# Patient Record
Sex: FEMALE | Race: WHITE | NOT HISPANIC OR LATINO | Employment: FULL TIME
[De-identification: names, ages, dates, MRNs, and addresses within clinical notes are randomized per-mention and may not be internally consistent; named-entity substitution may affect disease eponyms.]

---
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
## 2018-03-02 ENCOUNTER — RADIANT APPOINTMENT (OUTPATIENT)
Dept: GENERAL RADIOLOGY | Facility: CLINIC | Age: 18
End: 2018-03-02
Attending: PREVENTIVE MEDICINE
Payer: COMMERCIAL

## 2018-03-02 ENCOUNTER — OFFICE VISIT (OUTPATIENT)
Dept: FAMILY MEDICINE | Facility: CLINIC | Age: 18
End: 2018-03-02
Payer: COMMERCIAL

## 2018-03-02 VITALS
HEIGHT: 72 IN | SYSTOLIC BLOOD PRESSURE: 137 MMHG | HEART RATE: 79 BPM | BODY MASS INDEX: 28.44 KG/M2 | DIASTOLIC BLOOD PRESSURE: 80 MMHG | WEIGHT: 210 LBS

## 2018-03-02 DIAGNOSIS — M54.12 CERVICAL RADICULAR PAIN: ICD-10-CM

## 2018-03-02 DIAGNOSIS — M25.512 ACUTE PAIN OF LEFT SHOULDER: ICD-10-CM

## 2018-03-02 DIAGNOSIS — M25.512 ACUTE PAIN OF LEFT SHOULDER: Primary | ICD-10-CM

## 2018-03-02 RX ORDER — NAPROXEN 500 MG/1
1 TABLET ORAL 2 TIMES DAILY
Qty: 30 TABLET | Refills: 1 | Status: SHIPPED | OUTPATIENT
Start: 2018-03-02 | End: 2018-11-30

## 2018-03-02 NOTE — MR AVS SNAPSHOT
"              After Visit Summary   3/2/2018    Shante Lara    MRN: [id number]           Patient Information     Date Of Birth          2000        Visit Information        Provider Department      3/2/2018 9:45 AM Collin Palacios MD [facility]        Today's Diagnoses     Acute pain of left shoulder    -  1    Cervical radicular pain           Follow-ups after your visit        Future tests that were ordered for you today     Open Future Orders        Priority Expected Expires Ordered    XR Cervical Spine 2/3 vws Routine 3/2/2018 4/1/2018 3/2/2018    XR Shoulder LT 2 VW Routine 3/2/2018 4/1/2018 3/2/2018            Who to contact     Please call your clinic at [phone] to:    Ask questions about your health    Make or cancel appointments    Discuss your medicines    Learn about your test results    Speak to your doctor            Additional Information About Your Visit        MyChart Information     [employer] gives you secure access to your electronic health record. If you see a primary care provider, you can also send messages to your care team and make appointments. If you have questions, please call your primary care clinic.  If you do not have a primary care provider, please call [phone] and they will assist you.      [employer] is an electronic gateway that provides easy, online access to your medical records. With [employer], you can request a clinic appointment, read your test results, renew a prescription or communicate with your care team.     To access your existing account, please contact your [facility] or call [phone] for assistance.        Care EveryWhere ID     This is your Care EveryWhere ID. This could be used by other organizations to access your [place] medical records  [id number]        Your Vitals Were     Pulse Height Last Period BMI (Body Mass Index)          79 1.905 m (6' 3\") 02/16/2018 (Approximate) 26.25 " kg/m2         Blood Pressure from Last 3 Encounters:   03/02/18 137/80   10/30/17 138/84   10/27/17 129/69    Weight from Last 3 Encounters:   03/02/18 95.3 kg (210 lb)   10/30/17 103.6 kg (228 lb 4.8 oz) (99 %)*   10/27/17 104.3 kg (230 lb) (99 %)*     * Growth percentiles are based on [facility] 2-20 Years data.                 Today's Medication Changes          These changes are accurate as of 3/2/18 10:29 AM.  If you have any questions, ask your nurse or doctor.               Start taking these medicines.        Dose/Directions    tiZANidine 4 MG tablet   Commonly known as:  ZANAFLEX   Used for:  Acute pain of left shoulder, Cervical radicular pain        Dose:  4-8 mg   Take 1-2 tablets (4-8 mg) by mouth nightly as needed for muscle spasms   Quantity:  30 tablet   Refills:  1            Where to get your medicines      These medications were sent to [place] [address] [place] [address]  [address] [place] [address]     Phone:  [phone]     naproxen 500 MG Tbec    tiZANidine 4 MG tablet                Primary Care Provider Office Phone # Fax #    Lucinda Cook [phone]       [facility] [address] [place] [address]        Equal Access to Services     NORA ANGELA AH: Hadii monica londono hadasho Sojuan, waaxda luqadaha, qaybta kaalmada yousif, mayur smith. So [facility] [phone].    ATENCIÓN: Si habla español, tiene a bright disposición servicios gratuitos de asistencia lingüística. Geraldine al [phone].    We comply with applicable federal civil rights laws and [place] laws. We do not discriminate on the basis of race, color, national origin, age, disability, sex, sexual orientation, or gender identity.            Thank you!     Thank you for choosing [facility]  for your care. Our goal is always to provide you with excellent care. Hearing back from our patients is one way we can continue  to improve our services. Please take a few minutes to complete the written survey that you may receive in the mail after your visit with us. Thank you!             Your Updated Medication List - Protect others around you: Learn how to safely use, store and throw away your medicines at www.disposemymeds.org.          This list is accurate as of 3/2/18 10:29 AM.  Always use your most recent med list.                   Brand Name Dispense Instructions for use Diagnosis    naproxen 500 MG Tbec     30 tablet    Take 1 capsule by mouth 2 times daily    Acute pain of left shoulder, Cervical radicular pain       tiZANidine 4 MG tablet    ZANAFLEX    30 tablet    Take 1-2 tablets (4-8 mg) by mouth nightly as needed for muscle spasms    Acute pain of left shoulder, Cervical radicular pain       venlafaxine 150 MG 24 hr capsule    EFFEXOR-XR     Take by mouth daily

## 2018-03-02 NOTE — PROGRESS NOTES
"HISTORY OF PRESENT ILLNESS  Ms. Lara is a pleasant 18 year old year old female who presents to clinic today with left shoulder pain x 2 weeks  Shante explains that she had no injury  Location: left shoulder  Quality:  achy pain    Severity: 5/10 at worst    Duration: 2 weeks  Timing: occurs intermittently with rowing worse  Modifying factors:  resting and non-use makes it better, movement and use makes it worse  Associated signs & symptoms: left arm numbness tingling, no signifcant neck pain  Previous similar pain: yes  Additional history: as documented    MEDICAL HISTORY  There is no problem list on file for this patient.      Current Outpatient Prescriptions   Medication Sig Dispense Refill     naproxen 500 MG TBEC Take 1 capsule by mouth 2 times daily 30 tablet 1     venlafaxine (EFFEXOR-XR) 150 MG 24 hr capsule Take by mouth daily         Allergies   Allergen Reactions     Sulfa Drugs Difficulty breathing       No family history on file.    Additional medical/Social/Surgical histories reviewed in EPIC and updated as appropriate.     REVIEW OF SYSTEMS (3/2/2018)  10 point ROS of systems including Constitutional, Eyes, Respiratory, Cardiovascular, Gastroenterology, Genitourinary, Integumentary, Musculoskeletal, Psychiatric were all negative except for pertinent positives noted in my HPI.     PHYSICAL EXAM  Vitals:    03/02/18 0945   BP: 137/80   Pulse: 79   Weight: 95.3 kg (210 lb)   Height: 1.905 m (6' 3\")     Vital Signs: /80  Pulse 79  Ht 1.905 m (6' 3\")  Wt 95.3 kg (210 lb)  LMP 02/16/2018 (Approximate)  BMI 26.25 kg/m2 Patient declined being weighed. Body mass index is 26.25 kg/(m^2).    General  - normal appearance, in no obvious distress  CV  - normal radial pulse  Pulm  - normal respiratory pattern, non-labored  Musculoskeletal - neck and left shoulder  - inspection: normal bone and joint alignment, no obvious deformity, no scapular winging, no AC step-off  - palpation: no bony or soft " tissue tenderness except left neck and upper shoulder trapezius, normal clavicle, non-tender AC  - ROM:  limited flexion, IR, ER, abduction, painful at end range of shoulder, and neck pain with left side bending, and with spurlings of neck  - strength: 5/5  strength, 5/5 in all shoulder planes  - special tests:  (-) Speed's  (-) Neer  (+) Hawkin's  (-) Bakari's  (+) Wendell's  (+) load & shift, supine  (-) apprehension  (-) subscap lift-off  Neuro  - no sensory or motor deficit, grossly normal coordination, normal muscle tone  Skin  - no ecchymosis, erythema, warmth, or induration, no obvious rash  Psych  - interactive, appropriate, normal mood and affect      ASSESSMENT & PLAN  19 yo with left shoulder pain and left arm tingling and left neck pain due to RC tendinitis, cervical radicular pain  xrays shoulder and neck  Naproxen and tizanadine nightly  Cont. Rehab and activity as tolerated   F/u after xrays  Consider MRI of shoulder and/or neck if not improving    Collin Palacios MD, CAQSM

## 2018-03-02 NOTE — LETTER
"  3/2/2018      RE: Shante Lara  [address] [place] [address]       HISTORY OF PRESENT ILLNESS  Ms. Lara is a pleasant 18 year old year old female who presents to clinic today with left shoulder pain x 2 weeks  Shante explains that she had no injury  Location: left shoulder  Quality:  achy pain    Severity: 5/10 at worst    Duration: 2 weeks  Timing: occurs intermittently with rowing worse  Modifying factors:  resting and non-use makes it better, movement and use makes it worse  Associated signs & symptoms: left arm numbness tingling, no signifcant neck pain  Previous similar pain: yes  Additional history: as documented    MEDICAL HISTORY  There is no problem list on file for this patient.      Current Outpatient Prescriptions   Medication Sig Dispense Refill     naproxen 500 MG TBEC Take 1 capsule by mouth 2 times daily 30 tablet 1     venlafaxine (EFFEXOR-XR) 150 MG 24 hr capsule Take by mouth daily         Allergies   Allergen Reactions     Sulfa Drugs Difficulty breathing       No family history on file.    Additional medical/Social/Surgical histories reviewed in [facility] and updated as appropriate.     REVIEW OF SYSTEMS (3/2/2018)  10 point ROS of systems including Constitutional, Eyes, Respiratory, Cardiovascular, Gastroenterology, Genitourinary, Integumentary, Musculoskeletal, Psychiatric were all negative except for pertinent positives noted in my HPI.     PHYSICAL EXAM  Vitals:    03/02/18 0945   BP: 137/80   Pulse: 79   Weight: 95.3 kg (210 lb)   Height: 1.905 m (6' 3\")     Vital Signs: /80  Pulse 79  Ht 1.905 m (6' 3\")  Wt 95.3 kg (210 lb)  LMP 02/16/2018 (Approximate)  BMI 26.25 kg/m2 Patient declined being weighed. Body mass index is 26.25 kg/(m^2).    General  - normal appearance, in no obvious distress  CV  - normal radial pulse  Pulm  - normal respiratory pattern, non-labored  Musculoskeletal - neck and left shoulder  - inspection: normal bone and joint " alignment, no obvious deformity, no scapular winging, no AC step-off  - palpation: no bony or soft tissue tenderness except left neck and upper shoulder trapezius, normal clavicle, non-tender AC  - ROM:  limited flexion, IR, ER, abduction, painful at end range of shoulder, and neck pain with left side bending, and with spurlings of neck  - strength: 5/5  strength, 5/5 in all shoulder planes  - special tests:  (-) Speed's  (-) Neer  (+) Hawkin's  (-) Bakari's  (+) Chittenden's  (+) load & shift, supine  (-) apprehension  (-) subscap lift-off  Neuro  - no sensory or motor deficit, grossly normal coordination, normal muscle tone  Skin  - no ecchymosis, erythema, warmth, or induration, no obvious rash  Psych  - interactive, appropriate, normal mood and affect      ASSESSMENT & PLAN  17 yo with left shoulder pain and left arm tingling and left neck pain due to RC tendinitis, cervical radicular pain  xrays shoulder and neck  Naproxen and tizanadine nightly  Cont. Rehab and activity as tolerated   F/u after xrays  Consider MRI of shoulder and/or neck if not improving    Collin Palacios MD, CAQSM

## 2018-03-05 ENCOUNTER — OFFICE VISIT (OUTPATIENT)
Dept: FAMILY MEDICINE | Facility: CLINIC | Age: 18
End: 2018-03-05
Payer: COMMERCIAL

## 2018-03-05 VITALS
DIASTOLIC BLOOD PRESSURE: 88 MMHG | HEART RATE: 90 BPM | BODY MASS INDEX: 31.77 KG/M2 | WEIGHT: 234.6 LBS | HEIGHT: 72 IN | SYSTOLIC BLOOD PRESSURE: 135 MMHG

## 2018-03-05 DIAGNOSIS — R20.2 TINGLING OF LEFT UPPER EXTREMITY: ICD-10-CM

## 2018-03-05 DIAGNOSIS — M54.12 CERVICAL RADICULAR PAIN: Primary | ICD-10-CM

## 2018-03-05 RX ORDER — METHYLPREDNISOLONE 4 MG
TABLET, DOSE PACK ORAL
Qty: 21 TABLET | Refills: 0 | Status: SHIPPED | OUTPATIENT
Start: 2018-03-05 | End: 2018-11-30

## 2018-03-05 NOTE — LETTER
"  3/5/2018      RE: Shante Lara  [address] [place] [address]       HISTORY OF PRESENT ILLNESS  Ms. Lara is a pleasant 18 year old year old female who presents to clinic today for followup for neck and shoulder pain, has some improvement in shoulder pain and ROM but her neck still hurts and feels tingling down her left arm to her hand that has not resolved    MEDICAL HISTORY  There is no problem list on file for this patient.      Current Outpatient Prescriptions   Medication Sig Dispense Refill     naproxen 500 MG TBEC Take 1 capsule by mouth 2 times daily (Patient not taking: Reported on 3/5/2018) 30 tablet 1     tiZANidine (ZANAFLEX) 4 MG tablet Take 1-2 tablets (4-8 mg) by mouth nightly as needed for muscle spasms (Patient not taking: Reported on 3/5/2018) 30 tablet 1     venlafaxine (EFFEXOR-XR) 150 MG 24 hr capsule Take by mouth daily         Allergies   Allergen Reactions     Sulfa Drugs Difficulty breathing       No family history on file.    Additional medical/Social/Surgical histories reviewed in EPIC and updated as appropriate.     REVIEW OF SYSTEMS (3/5/2018)  10 point ROS of systems including Constitutional, Eyes, Respiratory, Cardiovascular, Gastroenterology, Genitourinary, Integumentary, Musculoskeletal, Psychiatric were all negative except for pertinent positives noted in my HPI.     PHYSICAL EXAM  Vitals:    03/05/18 1637   BP: 135/88   Pulse: 90   Weight: 106.4 kg (234 lb 9.6 oz)   Height: 1.905 m (6' 3\")     Vital Signs: /88  Pulse 90  Ht 1.905 m (6' 3\")  Wt 106.4 kg (234 lb 9.6 oz)  LMP 02/16/2018 (Approximate)  BMI 29.32 kg/m2 Patient declined being weighed. Body mass index is 29.32 kg/(m^2).    General  - normal appearance, in no obvious distress  CV  - normal radial pulse  Pulm  - normal respiratory pattern, non-labored  Musculoskeletal - neck and left shoulder  - inspection: normal bone and joint alignment, no obvious deformity, no scapular winging, " no AC step-off  - palpation: no bony or soft tissue tenderness except left neck and upper shoulder trapezius, normal clavicle, non-tender AC  - ROM: neck still has some limited flexion and extension, but shoulder has improved IR, ER, abduction, still slightly painful at end range of shoulder, and neck pain with left side bending, and with spurlings of neck, not improved  - strength: 5/5  strength, 5/5 in all shoulder planes  - special tests:  (-) Speed's  (-) Neer  (+) Hawkin's  (-) Bakari's  (+) Llano's, improved  (+) load & shift, supine, improved  (-) apprehension  (-) subscap lift-off  Neuro  - no sensory or motor deficit, grossly normal coordination, normal muscle tone  Skin  - no ecchymosis, erythema, warmth, or induration, no obvious rash  Psych  - interactive, appropriate, normal mood and affect      ASSESSMENT & PLAN  19 yo with left shoulder pain and left arm tingling and left neck pain due to RC tendinitis, cervical radicular pain, not improved  Ordered MRI cervical spine  Start medrol pack  F/u after MRI  Collin Palacios MD, CAQSM

## 2018-03-05 NOTE — MR AVS SNAPSHOT
After Visit Summary   3/5/2018    Shante Lara    MRN: [id number]           Patient Information     Date Of Birth          2000        Visit Information        Provider Department      3/5/2018 4:45 PM Collin Palacios MD [facility]        Today's Diagnoses     Cervical radicular pain    -  1    Tingling of left upper extremity           Follow-ups after your visit        Your next 10 appointments already scheduled     Mar 06, 2018  2:30 PM CST   (Arrive by 2:15 PM)   MR CERVICAL SPINE W/O CONTRAST with [id number]   [facility] [place] [facility] ([facility] [place])    [address] [address] [phone]           Take your medicines as usual, unless your doctor tells you not to. Bring a list of your current medicines to your exam (including vitamins, minerals and over-the-counter drugs). Also bring the results of similar scans you may have had.  Please remove any body piercings and hair extensions before you arrive.  Follow your doctor s orders. If you do not, we may have to postpone your exam.  You may or may not receive IV contrast for this exam pending the discretion of the Radiologist.  You do not need to do anything special to prepare.  The MRI machine uses a strong magnet. Please wear clothes without metal (snaps, zippers). A sweatsuit works well, or we may give you a hospital gown.   **IMPORTANT** THE INSTRUCTIONS BELOW ARE ONLY FOR THOSE PATIENTS WHO HAVE BEEN PRESCRIBED SEDATION OR GENERAL ANESTHESIA DURING THEIR MRI PROCEDURE:  IF YOUR DOCTOR PRESCRIBED ORAL SEDATION (take medicine to help you relax during your exam):   You must get the medicine from your doctor (oral medication) before you arrive. Bring the medicine to the exam. Do not take it at home. You ll be told when to take it upon arriving for your exam.   Arrive one hour early. Bring someone who can take you home after the test. Your medicine  will make you sleepy. After the exam, you may not drive, take a bus or take a taxi by yourself.  IF YOUR DOCTOR PRESCRIBED IV SEDATION:   Arrive one hour early. Bring someone who can take you home after the test. Your medicine will make you sleepy. After the exam, you may not drive, take a bus or take a taxi by yourself.   No eating 6 hours before your exam. You may have clear liquids up until 4 hours before your exam. (Clear liquids include water, clear tea, black coffee and fruit juice without pulp.)  IF YOUR DOCTOR PRESCRIBED ANESTHESIA (be asleep for your exam):   Arrive 1 1/2 hours early. Bring someone who can take you home after the test. You may not drive, take a bus or take a taxi by yourself.   No eating 8 hours before your exam. You may have clear liquids up until 4 hours before your exam. (Clear liquids include water, clear tea, black coffee and fruit juice without pulp.)   You will spend four to five hours in the recovery room.  Please call the Imaging Department at your exam site with any questions.              Future tests that were ordered for you today     Open Future Orders        Priority Expected Expires Ordered    MRI Cervical spine w/o contrast Routine  3/12/2018 3/5/2018            Who to contact     Please call your clinic at [phone] to:    Ask questions about your health    Make or cancel appointments    Discuss your medicines    Learn about your test results    Speak to your doctor            Additional Information About Your Visit        [employer] Information     [employer] gives you secure access to your electronic health record. If you see a primary care provider, you can also send messages to your care team and make appointments. If you have questions, please call your primary care clinic.  If you do not have a primary care provider, please call [phone] and they will assist you.      [employer] is an electronic gateway that provides easy, online access to your medical records. With  "MyChart, you can request a clinic appointment, read your test results, renew a prescription or communicate with your care team.     To access your existing account, please contact your [facility] or call [phone] for assistance.        Care EveryWhere ID     This is your Care EveryWhere ID. This could be used by other organizations to access your [place] medical records  [id number]        Your Vitals Were     Pulse Height Last Period BMI (Body Mass Index)          90 1.905 m (6' 3\") 02/16/2018 (Approximate) 29.32 kg/m2         Blood Pressure from Last 3 Encounters:   03/05/18 135/88   03/02/18 137/80   10/30/17 138/84    Weight from Last 3 Encounters:   03/05/18 106.4 kg (234 lb 9.6 oz)   03/02/18 95.3 kg (210 lb)   10/30/17 103.6 kg (228 lb 4.8 oz) (99 %)*     * Growth percentiles are based on CDC 2-20 Years data.                 Today's Medication Changes          These changes are accurate as of 3/5/18 11:59 PM.  If you have any questions, ask your nurse or doctor.               Start taking these medicines.        Dose/Directions    methylPREDNISolone 4 MG tablet   Commonly known as:  MEDROL DOSEPAK   Used for:  Cervical radicular pain        Follow package instructions   Quantity:  21 tablet   Refills:  0            Where to get your medicines      These medications were sent to [facility] [address] [place] [facility] [address]   [address] [facility] [address]     Phone:  [phone]     methylPREDNISolone 4 MG tablet                Primary Care Provider Office Phone # Fax #    Lucinda Cook [phone]       [facility] [address] [address] [address]        Equal Access to Services     CUONG ANGELA AH: Adalid Sierra, aby verdin, mayur newton. So [facility] [phone].    ATENCIÓN: Si habla español, tiene a bright disposición servicios gratuitos " de asistencia lingüística. Geraldine barone [phone].    We comply with applicable federal civil rights laws and [place] laws. We do not discriminate on the basis of race, color, national origin, age, disability, sex, sexual orientation, or gender identity.            Thank you!     Thank you for choosing [facility]  for your care. Our goal is always to provide you with excellent care. Hearing back from our patients is one way we can continue to improve our services. Please take a few minutes to complete the written survey that you may receive in the mail after your visit with us. Thank you!             Your Updated Medication List - Protect others around you: Learn how to safely use, store and throw away your medicines at www.disposemymeds.org.          This list is accurate as of 3/5/18 11:59 PM.  Always use your most recent med list.                   Brand Name Dispense Instructions for use Diagnosis    methylPREDNISolone 4 MG tablet    MEDROL DOSEPAK    21 tablet    Follow package instructions    Cervical radicular pain       naproxen 500 MG Tbec     30 tablet    Take 1 capsule by mouth 2 times daily    Acute pain of left shoulder, Cervical radicular pain       tiZANidine 4 MG tablet    ZANAFLEX    30 tablet    Take 1-2 tablets (4-8 mg) by mouth nightly as needed for muscle spasms    Acute pain of left shoulder, Cervical radicular pain       venlafaxine 150 MG 24 hr capsule    EFFEXOR-XR     Take by mouth daily

## 2018-03-05 NOTE — PROGRESS NOTES
"HISTORY OF PRESENT ILLNESS  Ms. Lara is a pleasant 18 year old year old female who presents to clinic today for followup for neck and shoulder pain, has some improvement in shoulder pain and ROM but her neck still hurts and feels tingling down her left arm to her hand that has not resolved    MEDICAL HISTORY  There is no problem list on file for this patient.      Current Outpatient Prescriptions   Medication Sig Dispense Refill     naproxen 500 MG TBEC Take 1 capsule by mouth 2 times daily (Patient not taking: Reported on 3/5/2018) 30 tablet 1     tiZANidine (ZANAFLEX) 4 MG tablet Take 1-2 tablets (4-8 mg) by mouth nightly as needed for muscle spasms (Patient not taking: Reported on 3/5/2018) 30 tablet 1     venlafaxine (EFFEXOR-XR) 150 MG 24 hr capsule Take by mouth daily         Allergies   Allergen Reactions     Sulfa Drugs Difficulty breathing       No family history on file.    Additional medical/Social/Surgical histories reviewed in EPIC and updated as appropriate.     REVIEW OF SYSTEMS (3/5/2018)  10 point ROS of systems including Constitutional, Eyes, Respiratory, Cardiovascular, Gastroenterology, Genitourinary, Integumentary, Musculoskeletal, Psychiatric were all negative except for pertinent positives noted in my HPI.     PHYSICAL EXAM  Vitals:    03/05/18 1637   BP: 135/88   Pulse: 90   Weight: 106.4 kg (234 lb 9.6 oz)   Height: 1.905 m (6' 3\")     Vital Signs: /88  Pulse 90  Ht 1.905 m (6' 3\")  Wt 106.4 kg (234 lb 9.6 oz)  LMP 02/16/2018 (Approximate)  BMI 29.32 kg/m2 Patient declined being weighed. Body mass index is 29.32 kg/(m^2).    General  - normal appearance, in no obvious distress  CV  - normal radial pulse  Pulm  - normal respiratory pattern, non-labored  Musculoskeletal - neck and left shoulder  - inspection: normal bone and joint alignment, no obvious deformity, no scapular winging, no AC step-off  - palpation: no bony or soft tissue tenderness except left neck and upper " shoulder trapezius, normal clavicle, non-tender AC  - ROM: neck still has some limited flexion and extension, but shoulder has improved IR, ER, abduction, still slightly painful at end range of shoulder, and neck pain with left side bending, and with spurlings of neck, not improved  - strength: 5/5  strength, 5/5 in all shoulder planes  - special tests:  (-) Speed's  (-) Neer  (+) Hawkin's  (-) Bakari's  (+) Willacy's, improved  (+) load & shift, supine, improved  (-) apprehension  (-) subscap lift-off  Neuro  - no sensory or motor deficit, grossly normal coordination, normal muscle tone  Skin  - no ecchymosis, erythema, warmth, or induration, no obvious rash  Psych  - interactive, appropriate, normal mood and affect      ASSESSMENT & PLAN  17 yo with left shoulder pain and left arm tingling and left neck pain due to RC tendinitis, cervical radicular pain, not improved  Ordered MRI cervical spine  Start medrol pack  F/u after MRI  Collin Palcaios MD, CAQSM

## 2018-03-06 ENCOUNTER — RADIANT APPOINTMENT (OUTPATIENT)
Dept: MRI IMAGING | Facility: CLINIC | Age: 18
End: 2018-03-06
Attending: PREVENTIVE MEDICINE
Payer: COMMERCIAL

## 2018-03-06 DIAGNOSIS — R20.2 TINGLING OF LEFT UPPER EXTREMITY: ICD-10-CM

## 2018-03-06 DIAGNOSIS — M54.12 CERVICAL RADICULAR PAIN: ICD-10-CM

## 2018-03-29 ENCOUNTER — OFFICE VISIT (OUTPATIENT)
Dept: FAMILY MEDICINE | Facility: CLINIC | Age: 18
End: 2018-03-29
Payer: COMMERCIAL

## 2018-03-29 VITALS
HEART RATE: 83 BPM | HEIGHT: 72 IN | DIASTOLIC BLOOD PRESSURE: 74 MMHG | WEIGHT: 237 LBS | BODY MASS INDEX: 32.1 KG/M2 | SYSTOLIC BLOOD PRESSURE: 124 MMHG

## 2018-03-29 DIAGNOSIS — M26.609 TEMPOROMANDIBULAR JOINT DISORDER: Primary | ICD-10-CM

## 2018-03-29 NOTE — PROGRESS NOTES
HPI  In for R TMJ pain.  She grinds her jaw a lot with rowing.  Has been bugging her for quite awhile now.  No other new issues.  Tried some manipulation that isn't helping.    ROS  As above    Physical Exam  Pain over the R TMJ joint.  Hurts with movement and palpation.    R TMJ  -will see Dr. Ann ENT for this asap  -soft diet avoid gum and excessive chewing

## 2018-03-29 NOTE — LETTER
3/29/2018      RE: Shante Lara  [address] [place] [address]       HPI  In for R TMJ pain.  She grinds her jaw a lot with rowing.  Has been bugging her for quite awhile now.  No other new issues.  Tried some manipulation that isn't helping.    ROS  As above    Physical Exam  Pain over the R TMJ joint.  Hurts with movement and palpation.    R TMJ  -will see Dr. Ann ENT for this asap  -soft diet avoid gum and excessive chewing    Collin eBst MD

## 2018-03-29 NOTE — MR AVS SNAPSHOT
"              After Visit Summary   3/29/2018    Shante Lara    MRN: [id number]           Patient Information     Date Of Birth          2000        Visit Information        Provider Department      3/29/2018 11:30 AM Collin Best MD [facility]        Today's Diagnoses     Temporomandibular joint disorder    -  1       Follow-ups after your visit        Who to contact     Please call your clinic at [phone] to:    Ask questions about your health    Make or cancel appointments    Discuss your medicines    Learn about your test results    Speak to your doctor            Additional Information About Your Visit        MyChart Information     [employer] gives you secure access to your electronic health record. If you see a primary care provider, you can also send messages to your care team and make appointments. If you have questions, please call your primary care clinic.  If you do not have a primary care provider, please call [phone] and they will assist you.      [employer] is an electronic gateway that provides easy, online access to your medical records. With [employer], you can request a clinic appointment, read your test results, renew a prescription or communicate with your care team.     To access your existing account, please contact your [facility] or call [phone] for assistance.        Care EveryWhere ID     This is your Care EveryWhere ID. This could be used by other organizations to access your [place] medical records  [id number]        Your Vitals Were     Pulse Height BMI (Body Mass Index)             83 1.905 m (6' 3\") 29.62 kg/m2          Blood Pressure from Last 3 Encounters:   03/29/18 124/74   03/05/18 135/88   03/02/18 137/80    Weight from Last 3 Encounters:   03/29/18 107.5 kg (237 lb)   03/05/18 106.4 kg (234 lb 9.6 oz)   03/02/18 95.3 kg (210 lb)              Today, you had the following     No orders found for display "       Primary Care Provider Office Phone # Fax #    Lucinda Cook [phone]       [facility] PEDIATRICS [address] [facility] [address]  [facility] [address]        Equal Access to Services     NORA ANGELA : Adalid monica londono suo Somarinali, waaxda luqadaha, qaybta kaalmada adeegyada, mayur limaannetta smith. So [facility] [phone].    ATENCIÓN: Si habla español, tiene a bright disposición servicios gratuitos de asistencia lingüística. [facility] [phone].    We comply with applicable federal civil rights laws and [place] laws. We do not discriminate on the basis of race, color, national origin, age, disability, sex, sexual orientation, or gender identity.            Thank you!     Thank you for choosing [facility] [place]  for your care. Our goal is always to provide you with excellent care. Hearing back from our patients is one way we can continue to improve our services. Please take a few minutes to complete the written survey that you may receive in the mail after your visit with us. Thank you!             Your Updated Medication List - Protect others around you: Learn how to safely use, store and throw away your medicines at www.disposemymeds.org.          This list is accurate as of 3/29/18 12:24 PM.  Always use your most recent med list.                   Brand Name Dispense Instructions for use Diagnosis    methylPREDNISolone 4 MG tablet    MEDROL DOSEPAK    21 tablet    Follow package instructions    Cervical radicular pain       naproxen 500 MG Tbec     30 tablet    Take 1 capsule by mouth 2 times daily    Acute pain of left shoulder, Cervical radicular pain       tiZANidine 4 MG tablet    ZANAFLEX    30 tablet    Take 1-2 tablets (4-8 mg) by mouth nightly as needed for muscle spasms    Acute pain of left shoulder, Cervical radicular pain       venlafaxine 150 MG 24 hr capsule    EFFEXOR-XR     Take by mouth daily

## 2018-04-16 NOTE — TELEPHONE ENCOUNTER
FUTURE VISIT INFORMATION      FUTURE VISIT INFORMATION:    Date: 04/18/2018    Time: 11:30    Location: [facility]  REFERRAL INFORMATION:    Referring provider:  DR. FLOOD    Referring providers clinic:  [facility]ACT[facility]    Reason for visit/diagnosis: R  TMJ JOINT        RECORDS STATUS    OFFICE VISIT: 03/29/2018

## 2018-04-18 ENCOUNTER — PRE VISIT (OUTPATIENT)
Dept: OTOLARYNGOLOGY | Facility: CLINIC | Age: 18
End: 2018-04-18

## 2018-04-18 ENCOUNTER — OFFICE VISIT (OUTPATIENT)
Dept: OTOLARYNGOLOGY | Facility: CLINIC | Age: 18
End: 2018-04-18
Payer: COMMERCIAL

## 2018-04-18 DIAGNOSIS — R19.8 TEETH CLENCHING: Primary | ICD-10-CM

## 2018-04-18 ASSESSMENT — PAIN SCALES - GENERAL: PAINLEVEL: NO PAIN (0)

## 2018-04-18 NOTE — NURSING NOTE
Chief Complaint   Patient presents with     Consult     Consultation for possible TMJ      Jluis Nair

## 2018-04-18 NOTE — PROGRESS NOTES
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 She is experience inceased pain in the pre-Richler pace rigt sidereater jacinto left. Sh denies subuation andisocaton an act she denies acual grinding her teeth. She notices it more duing the stress othe game and not at night. She has no dysphasia diet aphsia shortness ofbreat fever chlls.     She's experiecd o tamt e canoaia rtre.  Herptmdcalhisoryi reeed a u mdcionaaloy ta.  rewf ytems sifcatat shsa n-sker andes o rnk aco. She dd undergo ortodontic treatment but this ad no sequela margoth that time.      past medical histor is unremarkable.     Review of systems significant only for th above.     Examination shows no tenderness on palpation to ither pre-o patcua mahogany. There is no early ely clicks or crepitus flt. The occlusions clss I wit no occlusal premature duties. There's no evidenceof grnding or bruxis Maxalt size openingis approimately 42 mm.  There is no occlusal sway. Remainder of head nd neck examinations unremakabl    Assessmet: probable occlusl clenchig.     Plan: would attain a panoramic's to rule out TMJ pathology. At he same time an occlsl philip polo will refer to dentitryfor treatment. Follow up with us as needd.

## 2018-04-18 NOTE — LETTER
4/18/2018       RE: Shante Lara  [address] [place] [address]     Dear Colleague,    Thank you for referring your patient, Shante Lara, to the [facility] EAR NOSE AND THROAT at [facility]. Please see a copy of my visit note below.     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 She is experience inceased pain in the pre-Richler pace rigt sidereater jacinto left. Sh denies subuation andisocaton an act she denies acual grinding her teeth. She notices it more duing the stress othe game and not at night. She has no dysphasia diet aphsia shortness ofbreat fever chlls.     She's experiecd o tamt e canoaia rtre.  Herptmdcalhisoryi reeed a u mdcionaaloy ta.  rewf ytems sifcatat shsa n-sker andes o rnk aco. She dd undergo ortodontic treatment but this ad no sequela margoth that time.      past medical histor is unremarkable.     Review of systems significant only for th above.     Examination shows no tenderness on palpation to ither pre-o patcua mahogany. There is no early ely clicks or crepitus flt. The occlusions clss I wit no occlusal premature duties. There's no evidenceof grnding or bruxis Maxalt size openingis approimately 42 mm.  There is no occlusal sway. Remainder of head nd neck examinations unremakabl    Assessmet: probable occlusl clenchig.     Plan: would attain a panoramic's to rule out TMJ pathology. At he same time an occlsl bte nora polo will refer to dentitryfor treatment. Follow up with us as needd.    Again, thank you for allowing me to participate in the care of your patient.      Sincerely,    Collin Ann MD

## 2018-04-18 NOTE — MR AVS SNAPSHOT
After Visit Summary   4/18/2018    Shante Lara    MRN: [id number]           Patient Information     Date Of Birth          2000        Visit Information        Provider Department      4/18/2018 11:30 AM Collin Ann MD [facility] Nose [facility] Th[facility]        Today's Diagnoses     Teeth clenching    -  1      Care Instructions    You were referred to the dental clinic to have an impression made for a custom bite garde to be made.  Josafat SiddiquiRN  [phone]              Follow-ups after your visit        Follow-up notes from your care team     Return if symptoms worsen or fail to improve.      Who to contact     Please call your clinic at [phone] to:    Ask questions about your health    Make or cancel appointments    Discuss your medicines    Learn about your test results    Speak to your doctor            Additional Information About Your Visit        MyChart Information     [employer] gives you secure access to your electronic health record. If you see a primary care provider, you can also send messages to your care team and make appointments. If you have questions, please call your primary care clinic.  If you do not have a primary care provider, please call [phone] and they will assist you.      [employer] is an electronic gateway that provides easy, online access to your medical records. With [employer], you can request a clinic appointment, read your test results, renew a prescription or communicate with your care team.     To access your existing account, please contact your [facility] or call [phone] for assistance.        Care EveryWhere ID     This is your Care EveryWhere ID. This could be used by other organizations to access your [place] medical records  [id number]         Blood Pressure from Last 3 Encounters:   03/29/18 124/74   03/05/18 135/88   03/02/18 137/80    Weight from Last 3 Encounters:   03/29/18 107.5 kg  (237 lb)   03/05/18 106.4 kg (234 lb 9.6 oz)   03/02/18 95.3 kg (210 lb)              Today, you had the following     No orders found for display       Primary Care Provider Office Phone # Fax #    Lucinda Cook [phone]       [facility] PEDIATRICS [address]  [facility] [address]        Equal Access to Services     [facility] Community HospitalISMAEL : Hadii aad ku hadasho Soomaali, waaxda luqadaha, qaybta kaalmada adeegyada, waxay idiin hayaan adeeg khruby lalesleyn . So [facility] [phone].    ATENCIÓN: Si hughla rick, tiene a bright disposición servicios gratuitos de asistencia lingüística. Llame al [phone].    We comply with applicable federal civil rights laws and [place] laws. We do not discriminate on the basis of race, color, national origin, age, disability, sex, sexual orientation, or gender identity.            Thank you!     Thank you for choosing [facility] EAR NOSE AND THROAT  for your care. Our goal is always to provide you with excellent care. Hearing back from our patients is one way we can continue to improve our services. Please take a few minutes to complete the written survey that you may receive in the mail after your visit with us. Thank you!             Your Updated Medication List - Protect others around you: Learn how to safely use, store and throw away your medicines at www.disposemymeds.org.          This list is accurate as of 4/18/18  1:16 PM.  Always use your most recent med list.                   Brand Name Dispense Instructions for use Diagnosis    methylPREDNISolone 4 MG tablet    MEDROL DOSEPAK    21 tablet    Follow package instructions    Cervical radicular pain       naproxen 500 MG Tbec     30 tablet    Take 1 capsule by mouth 2 times daily    Acute pain of left shoulder, Cervical radicular pain       tiZANidine 4 MG tablet    ZANAFLEX    30 tablet    Take 1-2 tablets (4-8 mg) by mouth nightly as needed for muscle spasms    Acute pain of left shoulder, Cervical  radicular pain       venlafaxine 150 MG 24 hr capsule    EFFEXOR-XR     Take by mouth daily

## 2018-04-18 NOTE — PATIENT INSTRUCTIONS
You were referred to the dental clinic to have an impression made for a custom bite garde to be made.  Josafat Siddiqui ,RN  [phone]

## 2018-09-07 ENCOUNTER — OFFICE VISIT (OUTPATIENT)
Dept: FAMILY MEDICINE | Facility: CLINIC | Age: 18
End: 2018-09-07
Payer: COMMERCIAL

## 2018-09-07 VITALS
SYSTOLIC BLOOD PRESSURE: 125 MMHG | DIASTOLIC BLOOD PRESSURE: 79 MMHG | BODY MASS INDEX: 33.72 KG/M2 | WEIGHT: 249 LBS | HEART RATE: 83 BPM | HEIGHT: 72 IN

## 2018-09-07 DIAGNOSIS — M84.374A STRESS FRACTURE OF RIGHT FOOT, INITIAL ENCOUNTER: Primary | ICD-10-CM

## 2018-09-07 NOTE — MR AVS SNAPSHOT
"              After Visit Summary   9/7/2018    Shante Lara    MRN: [id number]           Patient Information     Date Of Birth          2000        Visit Information        Provider Department      9/7/2018 10:00 AM Collin Palacios MD [facility]        Today's Diagnoses     Stress fracture of right foot, initial encounter    -  1       Follow-ups after your visit        Who to contact     Please call your clinic at [phone] to:    Ask questions about your health    Make or cancel appointments    Discuss your medicines    Learn about your test results    Speak to your doctor            Additional Information About Your Visit        MyChart Information     [employer] gives you secure access to your electronic health record. If you see a primary care provider, you can also send messages to your care team and make appointments. If you have questions, please call your primary care clinic.  If you do not have a primary care provider, please call [phone] and they will assist you.      [employer] is an electronic gateway that provides easy, online access to your medical records. With [employer], you can request a clinic appointment, read your test results, renew a prescription or communicate with your care team.     To access your existing account, please contact your [facility] or call [phone] for assistance.        Care EveryWhere ID     This is your Care EveryWhere ID. This could be used by other organizations to access your [place] medical records  [id number]        Your Vitals Were     Pulse Height Last Period BMI (Body Mass Index)          83 1.905 m (6' 3\") 09/03/2018 31.12 kg/m2         Blood Pressure from Last 3 Encounters:   10/03/18 129/82   09/26/18 134/83   09/07/18 125/79    Weight from Last 3 Encounters:   10/03/18 112.5 kg (248 lb)   09/26/18 114.3 kg (252 lb)   09/07/18 112.9 kg (249 lb)              Today, you had the " following     No orders found for display       Primary Care Provider Office Phone # Fax #    Lucinda Cook [phone]       [facility] PEDIATRICS [address]  [facility] [address]        Equal Access to Services     NORA ANGELA : Hadii monica ku suo Soomaali, waaxda luqadaha, qaybta kaalmada adenicolasyada, mayur fairbanksn martell isaac laDevorahannetta smith. So [facility] [phone].    ATENCIÓN: Si habla español, tiene a bright disposición servicios gratuitos de asistencia lingüística. LlCleveland Clinic [phone].    We comply with applicable federal civil rights laws and [place] laws. We do not discriminate on the basis of race, color, national origin, age, disability, sex, sexual orientation, or gender identity.            Thank you!     Thank you for choosing [facility]  for your care. Our goal is always to provide you with excellent care. Hearing back from our patients is one way we can continue to improve our services. Please take a few minutes to complete the written survey that you may receive in the mail after your visit with us. Thank you!             Your Updated Medication List - Protect others around you: Learn how to safely use, store and throw away your medicines at www.disposemymeds.org.          This list is accurate as of 9/7/18 11:59 PM.  Always use your most recent med list.                   Brand Name Dispense Instructions for use Diagnosis    methylPREDNISolone 4 MG tablet    MEDROL DOSEPAK    21 tablet    Follow package instructions    Cervical radicular pain       naproxen 500 MG Tbec     30 tablet    Take 1 capsule by mouth 2 times daily    Acute pain of left shoulder, Cervical radicular pain       tiZANidine 4 MG tablet    ZANAFLEX    30 tablet    Take 1-2 tablets (4-8 mg) by mouth nightly as needed for muscle spasms    Acute pain of left shoulder, Cervical radicular pain       venlafaxine 150 MG 24 hr capsule    EFFEXOR-XR     Take by mouth daily

## 2018-09-07 NOTE — PROGRESS NOTES
"HISTORY OF PRESENT ILLNESS  Ms. Lara is a pleasant 18 year old year old female who presents to clinic today with right foot stress fracture Shante explains that she was seen at [facility] last month and had xrays and was diagnosed with a stress fx. She wore a walking boot x 3 weeks, and stopped using last week  She has tried to run and row and it becomes more painful  Location: right foot  Quality:  Sharp pain    Severity: 4/10 at worst    Duration: 1 month  Timing: occurs intermittently  Context: occurs while exercising and lifting  Modifying factors:  resting and non-use makes it better, movement and use makes it worse  Additional history: as documented    MEDICAL HISTORY  Patient Active Problem List   Diagnosis     Teeth clenching       Current Outpatient Prescriptions   Medication Sig Dispense Refill     venlafaxine (EFFEXOR-XR) 150 MG 24 hr capsule Take by mouth daily       methylPREDNISolone (MEDROL DOSEPAK) 4 MG tablet Follow package instructions (Patient not taking: Reported on 4/18/2018) 21 tablet 0     naproxen 500 MG TBEC Take 1 capsule by mouth 2 times daily (Patient not taking: Reported on 3/5/2018) 30 tablet 1     tiZANidine (ZANAFLEX) 4 MG tablet Take 1-2 tablets (4-8 mg) by mouth nightly as needed for muscle spasms (Patient not taking: Reported on 3/5/2018) 30 tablet 1       Allergies   Allergen Reactions     Sulfa Drugs Difficulty breathing       No family history on file.    Additional medical/Social/Surgical histories reviewed in [facility] and updated as appropriate.     REVIEW OF SYSTEMS (9/7/2018)  10 point ROS of systems including Constitutional, Eyes, Respiratory, Cardiovascular, Gastroenterology, Genitourinary, Integumentary, Musculoskeletal, Psychiatric were all negative except for pertinent positives noted in my HPI.     PHYSICAL EXAM  Vitals:    09/07/18 0959   BP: 125/79   Pulse: 83   Weight: 112.9 kg (249 lb)   Height: 1.905 m (6' 3\")     Vital Signs: /79  Pulse 83  Ht 1.905 m (6' 3\") "  Wt 112.9 kg (249 lb)  LMP 09/03/2018  BMI 31.12 kg/m2 Patient declined being weighed. Body mass index is 31.12 kg/(m^2).    General  - normal appearance, in no obvious distress  CV  - normal pulses at posterior tib and dorsalis pedis  Pulm  - normal respiratory pattern, non-labored  Musculoskeletal - right foot  - stance: gait does not favors affected side, not reluctant to bear weight  - inspection: no swelling, normal bone and joint alignment, no obvious deformity  - palpation: tenderness over distal 3rd and 4th metatarsals, no tenderness over lateral or medial malleoli, navicular, or base of 5th MT  - ROM: intact globally but limited secondary to pain  - strength: 5/5 in eversion, 5/5 in all other planes    Neuro  - no sensory or motor deficit, grossly normal coordination, normal muscle tone  Skin  - NO ecchymosis overlying lateral foot-ankle junction, no warmth or induration, no obvious rash  Psych  - interactive, appropriate, normal mood and affect    ASSESSMENT & PLAN  19 yo female with right foot metatarsal stress fractures  Reviewed her use of boot and current exercise plan  She has tried to run over the past week and the pain has gotten worse  Recommend bike, elliptical, low impact running  Row if no pain  Ice PRN  F/u in 2 weeks  Discussed with atc and athlete    Collin Palacios MD, CAQSM

## 2018-09-07 NOTE — LETTER
9/7/2018      RE: Shante Lara  [address] [place] [address]       HISTORY OF PRESENT ILLNESS  Ms. Lara is a pleasant 18 year old year old female who presents to clinic today with right foot stress fracture Shante explains that she was seen at [facility] last month and had xrays and was diagnosed with a stress fx. She wore a walking boot x 3 weeks, and stopped using last week  She has tried to run and row and it becomes more painful  Location: right foot  Quality:  Sharp pain    Severity: 4/10 at worst    Duration: 1 month  Timing: occurs intermittently  Context: occurs while exercising and lifting  Modifying factors:  resting and non-use makes it better, movement and use makes it worse  Additional history: as documented    MEDICAL HISTORY  Patient Active Problem List   Diagnosis     Teeth clenching       Current Outpatient Prescriptions   Medication Sig Dispense Refill     venlafaxine (EFFEXOR-XR) 150 MG 24 hr capsule Take by mouth daily       methylPREDNISolone (MEDROL DOSEPAK) 4 MG tablet Follow package instructions (Patient not taking: Reported on 4/18/2018) 21 tablet 0     naproxen 500 MG TBEC Take 1 capsule by mouth 2 times daily (Patient not taking: Reported on 3/5/2018) 30 tablet 1     tiZANidine (ZANAFLEX) 4 MG tablet Take 1-2 tablets (4-8 mg) by mouth nightly as needed for muscle spasms (Patient not taking: Reported on 3/5/2018) 30 tablet 1       Allergies   Allergen Reactions     Sulfa Drugs Difficulty breathing       No family history on file.    Additional medical/Social/Surgical histories reviewed in [facility] and updated as appropriate.     REVIEW OF SYSTEMS (9/7/2018)  10 point ROS of systems including Constitutional, Eyes, Respiratory, Cardiovascular, Gastroenterology, Genitourinary, Integumentary, Musculoskeletal, Psychiatric were all negative except for pertinent positives noted in my HPI.     PHYSICAL EXAM  Vitals:    09/07/18 0959   BP: 125/79   Pulse: 83   Weight: 112.9 kg  "(249 lb)   Height: 1.905 m (6' 3\")     Vital Signs: /79  Pulse 83  Ht 1.905 m (6' 3\")  Wt 112.9 kg (249 lb)  LMP 09/03/2018  BMI 31.12 kg/m2 Patient declined being weighed. Body mass index is 31.12 kg/(m^2).    General  - normal appearance, in no obvious distress  CV  - normal pulses at posterior tib and dorsalis pedis  Pulm  - normal respiratory pattern, non-labored  Musculoskeletal - right foot  - stance: gait does not favors affected side, not reluctant to bear weight  - inspection: no swelling, normal bone and joint alignment, no obvious deformity  - palpation: tenderness over distal 3rd and 4th metatarsals, no tenderness over lateral or medial malleoli, navicular, or base of 5th MT  - ROM: intact globally but limited secondary to pain  - strength: 5/5 in eversion, 5/5 in all other planes    Neuro  - no sensory or motor deficit, grossly normal coordination, normal muscle tone  Skin  - NO ecchymosis overlying lateral foot-ankle junction, no warmth or induration, no obvious rash  Psych  - interactive, appropriate, normal mood and affect    ASSESSMENT & PLAN  17 yo female with right foot metatarsal stress fractures  Reviewed her use of boot and current exercise plan  She has tried to run over the past week and the pain has gotten worse  Recommend bike, elliptical, low impact running  Row if no pain  Ice PRN  F/u in 2 weeks  Discussed with atc and athlete    Collin Palacios MD, CAQSM        "

## 2018-09-26 ENCOUNTER — OFFICE VISIT (OUTPATIENT)
Dept: ORTHOPEDICS | Facility: CLINIC | Age: 18
End: 2018-09-26
Payer: COMMERCIAL

## 2018-09-26 VITALS
WEIGHT: 252 LBS | BODY MASS INDEX: 34.13 KG/M2 | DIASTOLIC BLOOD PRESSURE: 83 MMHG | HEIGHT: 72 IN | SYSTOLIC BLOOD PRESSURE: 134 MMHG | HEART RATE: 90 BPM

## 2018-09-26 DIAGNOSIS — M79.671 RIGHT FOOT PAIN: Primary | ICD-10-CM

## 2018-09-26 NOTE — LETTER
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 No significant repetitive activities or injury. Seen at [facility] on 8/8/18 where xrays obtained and will be reviewed below. She was diagnosed with a metatarsal stress fracture and placed in a CAM boot which she wore for 3 weeks. She then started rowing and had increased pain. She saw Dr. Palacios on 9/7/18 and was instructed to wear the boot for another week. She then came out of the boot again and returned to practice, but her pain is persisting. No significant pain with walking, but notices most when pushing off on erg. Can bike and elliptical pain-free. She thinks the pain will worsen if she keeps practicing. H/O rib stress fracture, but no other stress fractures or acute fractures. Adequate calcium in diet.          Medications:     Current Outpatient Prescriptions   Medication Sig     methylPREDNISolone (MEDROL DOSEPAK) 4 MG tablet Follow package instructions (Patient not taking: Reported on 4/18/2018)     naproxen 500 MG TBEC Take 1 capsule by mouth 2 times daily (Patient not taking: Reported on 3/5/2018)     tiZANidine (ZANAFLEX) 4 MG tablet Take 1-2 tablets (4-8 mg) by mouth nightly as needed for muscle spasms (Patient not taking: Reported on 3/5/2018)     venlafaxine (EFFEXOR-XR) 150 MG 24 hr capsule Take by mouth daily     No current facility-administered medications for this visit.              Allergies:      Allergies   Allergen Reactions     Sulfa Drugs Difficulty breathing            Review of Systems:   A comprehensive 10 point review of systems (constitutional, ENT, cardiac, peripheral vascular, respiratory, GI, , Musculoskeletal, skin, Neurological) was performed and found to be negative  "except as described in this note.           Physical Exam:   COMPLETE EXAMINATION:   VITAL SIGNS: /83  Pulse 90  Ht 1.905 m (6' 3\")  Wt 114.3 kg (252 lb)  LMP 09/10/2018  BMI 31.5 kg/m2  GEN: Alert, well-nourished, and in no distress.   NEURO: Alert and oriented to person, place, and time. Normal reflexes. No cranial nerve deficit. Gait normal. Coordination normal.   SKIN: No rash, warmth or erythema  MSK: Right foot - No swelling or deformity. TTP along distal 2-4 metatarsals dorsally, no TTP along plantar aspect of foot, pain with single leg hop, no pain with metatarsal squeeze. Normal ROM and ankle and hind foot.             Imaging:   Previous xrays from [facility] on 8/8/18 3 Views right foot reveal no abnormality or evidence for stress fracture        Assessment:     Persistent Right Foot Pain, rule out metatarsal stress fracture        Plan:     MRI right foot.  Continue activity modifications - may bike, elliptical and lightly row avoiding excessive force through foot.      Aurora Ca MD    "

## 2018-09-26 NOTE — PROGRESS NOTES
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 No significant repetitive activities or injury. Seen at [facility] on 8/8/18 where xrays obtained and will be reviewed below. She was diagnosed with a metatarsal stress fracture and placed in a CAM boot which she wore for 3 weeks. She then started rowing and had increased pain. She saw Dr. Palacios on 9/7/18 and was instructed to wear the boot for another week. She then came out of the boot again and returned to practice, but her pain is persisting. No significant pain with walking, but notices most when pushing off on erg. Can bike and elliptical pain-free. She thinks the pain will worsen if she keeps practicing. H/O rib stress fracture, but no other stress fractures or acute fractures. Adequate calcium in diet.          Medications:     Current Outpatient Prescriptions   Medication Sig     methylPREDNISolone (MEDROL DOSEPAK) 4 MG tablet Follow package instructions (Patient not taking: Reported on 4/18/2018)     naproxen 500 MG TBEC Take 1 capsule by mouth 2 times daily (Patient not taking: Reported on 3/5/2018)     tiZANidine (ZANAFLEX) 4 MG tablet Take 1-2 tablets (4-8 mg) by mouth nightly as needed for muscle spasms (Patient not taking: Reported on 3/5/2018)     venlafaxine (EFFEXOR-XR) 150 MG 24 hr capsule Take by mouth daily     No current facility-administered medications for this visit.              Allergies:      Allergies   Allergen Reactions     Sulfa Drugs Difficulty breathing            Review of Systems:   A comprehensive 10 point review of systems (constitutional, ENT, cardiac, peripheral vascular, respiratory, GI, , Musculoskeletal, skin, Neurological) was performed and found to be negative except as described in this note.           Physical Exam:   COMPLETE EXAMINATION:   VITAL SIGNS: BP  "134/83  Pulse 90  Ht 1.905 m (6' 3\")  Wt 114.3 kg (252 lb)  LMP 09/10/2018  BMI 31.5 kg/m2  GEN: Alert, well-nourished, and in no distress.   NEURO: Alert and oriented to person, place, and time. Normal reflexes. No cranial nerve deficit. Gait normal. Coordination normal.   SKIN: No rash, warmth or erythema  MSK: Right foot - No swelling or deformity. TTP along distal 2-4 metatarsals dorsally, no TTP along plantar aspect of foot, pain with single leg hop, no pain with metatarsal squeeze. Normal ROM and ankle and hind foot.             Imaging:   Previous xrays from [facility] on 8/8/18 3 Views right foot reveal no abnormality or evidence for stress fracture        Assessment:     Persistent Right Foot Pain, rule out metatarsal stress fracture        Plan:     MRI right foot.  Continue activity modifications - may bike, elliptical and lightly row avoiding excessive force through foot.    Aurora Ca      "

## 2018-09-28 ENCOUNTER — RADIANT APPOINTMENT (OUTPATIENT)
Dept: MRI IMAGING | Facility: CLINIC | Age: 18
End: 2018-09-28
Attending: PEDIATRICS
Payer: COMMERCIAL

## 2018-09-28 DIAGNOSIS — M79.671 RIGHT FOOT PAIN: ICD-10-CM

## 2018-10-03 ENCOUNTER — OFFICE VISIT (OUTPATIENT)
Dept: ORTHOPEDICS | Facility: CLINIC | Age: 18
End: 2018-10-03
Payer: COMMERCIAL

## 2018-10-03 VITALS
SYSTOLIC BLOOD PRESSURE: 129 MMHG | HEIGHT: 72 IN | BODY MASS INDEX: 33.59 KG/M2 | HEART RATE: 78 BPM | DIASTOLIC BLOOD PRESSURE: 82 MMHG | WEIGHT: 248 LBS

## 2018-10-03 DIAGNOSIS — M79.671 RIGHT FOOT PAIN: Primary | ICD-10-CM

## 2018-10-03 NOTE — MR AVS SNAPSHOT
"              After Visit Summary   10/3/2018    Shante Lara    MRN: [id number]           Patient Information     Date Of Birth          2000        Visit Information        Provider Department      10/3/2018 1:30 PM Aurora Ca MD [facility]        Today's Diagnoses     Right foot pain    -  1       Follow-ups after your visit        Who to contact     Please call your clinic at [phone] to:    Ask questions about your health    Make or cancel appointments    Discuss your medicines    Learn about your test results    Speak to your doctor            Additional Information About Your Visit        MyChart Information     [employer] gives you secure access to your electronic health record. If you see a primary care provider, you can also send messages to your care team and make appointments. If you have questions, please call your primary care clinic.  If you do not have a primary care provider, please call [phone] and they will assist you.      [employer] is an electronic gateway that provides easy, online access to your medical records. With [employer], you can request a clinic appointment, read your test results, renew a prescription or communicate with your care team.     To access your existing account, please contact your [facility] or call [phone] for assistance.        Care EveryWhere ID     This is your Care EveryWhere ID. This could be used by other organizations to access your [facility] medical records  [id number]        Your Vitals Were     Pulse Height Last Period BMI (Body Mass Index)          78 1.905 m (6' 3\") 09/10/2018 31 kg/m2         Blood Pressure from Last 3 Encounters:   10/03/18 129/82   09/26/18 134/83   09/07/18 125/79    Weight from Last 3 Encounters:   10/03/18 112.5 kg (248 lb)   09/26/18 114.3 kg (252 lb)   09/07/18 112.9 kg (249 lb)              Today, you had the following     No orders found for display "       Primary Care Provider Office Phone # Fax #    Lucinda Cook [phone]       [facility]ATR[facility] [address]  [facility] [address]        Equal Access to Services     NORA ANGELA : Adalid monica londono suo Somarinali, waaxda luqadaha, qaybta kaalmada adeegyada, mayur isaac laDevorahannetta smith. So [facility] [phone].    ATENCIÓN: Si habla español, tiene a bright disposición servicios gratuitos de asistencia lingüística. [facility] [phone].    We comply with applicable federal civil rights laws and [place] laws. We do not discriminate on the basis of race, color, national origin, age, disability, sex, sexual orientation, or gender identity.            Thank you!     Thank you for choosing [facility]  for your care. Our goal is always to provide you with excellent care. Hearing back from our patients is one way we can continue to improve our services. Please take a few minutes to complete the written survey that you may receive in the mail after your visit with us. Thank you!             Your Updated Medication List - Protect others around you: Learn how to safely use, store and throw away your medicines at www.disposemymeds.org.          This list is accurate as of 10/3/18  1:40 PM.  Always use your most recent med list.                   Brand Name Dispense Instructions for use Diagnosis    methylPREDNISolone 4 MG tablet    MEDROL DOSEPAK    21 tablet    Follow package instructions    Cervical radicular pain       naproxen 500 MG Tbec     30 tablet    Take 1 capsule by mouth 2 times daily    Acute pain of left shoulder, Cervical radicular pain       tiZANidine 4 MG tablet    ZANAFLEX    30 tablet    Take 1-2 tablets (4-8 mg) by mouth nightly as needed for muscle spasms    Acute pain of left shoulder, Cervical radicular pain       venlafaxine 150 MG 24 hr capsule    EFFEXOR-XR     Take by mouth daily

## 2018-10-03 NOTE — PROGRESS NOTES
"[facility] FOLLOW UP    Shante Lara MRN# [id number]   Age: 18 year old YOB: 2000             History of Present Illness:     MRI follow up. No changes in symptoms.          Medications:     Current Outpatient Prescriptions   Medication Sig     methylPREDNISolone (MEDROL DOSEPAK) 4 MG tablet Follow package instructions (Patient not taking: Reported on 4/18/2018)     naproxen 500 MG TBEC Take 1 capsule by mouth 2 times daily (Patient not taking: Reported on 3/5/2018)     tiZANidine (ZANAFLEX) 4 MG tablet Take 1-2 tablets (4-8 mg) by mouth nightly as needed for muscle spasms (Patient not taking: Reported on 3/5/2018)     venlafaxine (EFFEXOR-XR) 150 MG 24 hr capsule Take by mouth daily     No current facility-administered medications for this visit.              Allergies:      Allergies   Allergen Reactions     Sulfa Drugs Difficulty breathing            Review of Systems:   A comprehensive 10 point review of systems (constitutional, ENT, cardiac, peripheral vascular, respiratory, GI, , Musculoskeletal, skin, Neurological) was performed and found to be negative except as described in this note.           Physical Exam:   COMPLETE EXAMINATION:   VITAL SIGNS: /82  Pulse 78  Ht 1.905 m (6' 3\")  Wt 112.5 kg (248 lb)  LMP 09/10/2018  BMI 31 kg/m2  GEN: Alert, well-nourished, and in no distress.     MSK: right foot: no swelling, mild TTP over dorsal 3rd MT, no TTP over plantar aspect            Imaging:   MRI Right Foot:  1. In the marked location, there is borderline increased fluid within  the intermetatarsal bursa between the 1st-4th metatarsal heads, which  would be consistent with mild bursitis. Clinical correlation  recommended.  2. No stress fracture of the metatarsals or proximal phalanges.  3. Nonspecific small fluid flexion between but not involving the bases  of the 3rd/4th metatarsals, distal lateral cuneiform, and distal  cuboid.  4. Nonspecific bony edema in the medial " base of the first distal  phalanx with sprain of the medial collateral ligament, possibly  sequela of prior injury. Recommend correlating for symptoms in this  location.        Assessment:   Right dorsal foot pain, mild metatarsal bursitis, no stress reaction        Plan:     Reviewed MRI results with patient today. Reassurance.  Activity as tolerated, supportive shoe.  Follow up prn.    Aurora Ca

## 2018-10-03 NOTE — LETTER
"  10/3/2018      RE: Shante Lara  [address] [place] [address]       [facility] FOLLOW UP    Shante Lara MRN# [id number]   Age: 18 year old YOB: 2000             History of Present Illness:     MRI follow up. No changes in symptoms.          Medications:     Current Outpatient Prescriptions   Medication Sig     methylPREDNISolone (MEDROL DOSEPAK) 4 MG tablet Follow package instructions (Patient not taking: Reported on 4/18/2018)     naproxen 500 MG TBEC Take 1 capsule by mouth 2 times daily (Patient not taking: Reported on 3/5/2018)     tiZANidine (ZANAFLEX) 4 MG tablet Take 1-2 tablets (4-8 mg) by mouth nightly as needed for muscle spasms (Patient not taking: Reported on 3/5/2018)     venlafaxine (EFFEXOR-XR) 150 MG 24 hr capsule Take by mouth daily     No current facility-administered medications for this visit.              Allergies:      Allergies   Allergen Reactions     Sulfa Drugs Difficulty breathing            Review of Systems:   A comprehensive 10 point review of systems (constitutional, ENT, cardiac, peripheral vascular, respiratory, GI, , Musculoskeletal, skin, Neurological) was performed and found to be negative except as described in this note.           Physical Exam:   COMPLETE EXAMINATION:   VITAL SIGNS: /82  Pulse 78  Ht 1.905 m (6' 3\")  Wt 112.5 kg (248 lb)  LMP 09/10/2018  BMI 31 kg/m2  GEN: Alert, well-nourished, and in no distress.     MSK: right foot: no swelling, mild TTP over dorsal 3rd MT, no TTP over plantar aspect            Imaging:   MRI Right Foot:  1. In the marked location, there is borderline increased fluid within  the intermetatarsal bursa between the 1st-4th metatarsal heads, which  would be consistent with mild bursitis. Clinical correlation  recommended.  2. No stress fracture of the metatarsals or proximal phalanges.  3. Nonspecific small fluid flexion between but not involving the bases  of the 3rd/4th " metatarsals, distal lateral cuneiform, and distal  cuboid.  4. Nonspecific bony edema in the medial base of the first distal  phalanx with sprain of the medial collateral ligament, possibly  sequela of prior injury. Recommend correlating for symptoms in this  location.        Assessment:   Right dorsal foot pain, mild metatarsal bursitis, no stress reaction        Plan:     Reviewed MRI results with patient today. Reassurance.  Activity as tolerated, supportive shoe.  Follow up prn.    Aurora Ca MD

## 2018-10-31 ENCOUNTER — RADIANT APPOINTMENT (OUTPATIENT)
Dept: GENERAL RADIOLOGY | Facility: CLINIC | Age: 18
End: 2018-10-31
Attending: PEDIATRICS
Payer: COMMERCIAL

## 2018-10-31 ENCOUNTER — OFFICE VISIT (OUTPATIENT)
Dept: ORTHOPEDICS | Facility: CLINIC | Age: 18
End: 2018-10-31
Payer: COMMERCIAL

## 2018-10-31 VITALS
SYSTOLIC BLOOD PRESSURE: 130 MMHG | WEIGHT: 250.4 LBS | HEIGHT: 72 IN | HEART RATE: 81 BPM | DIASTOLIC BLOOD PRESSURE: 84 MMHG | BODY MASS INDEX: 33.92 KG/M2

## 2018-10-31 DIAGNOSIS — R07.81 RIB PAIN ON LEFT SIDE: ICD-10-CM

## 2018-10-31 DIAGNOSIS — R07.81 RIB PAIN ON LEFT SIDE: Primary | ICD-10-CM

## 2018-10-31 NOTE — MR AVS SNAPSHOT
"              After Visit Summary   10/31/2018    Shante Lara    MRN: [id number]           Patient Information     Date Of Birth          2000        Visit Information        Provider Department      10/31/2018 3:45 PM Aurora Ca MD [facility]        Today's Diagnoses     Rib pain on left side    -  1       Follow-ups after your visit        Who to contact     Please call your clinic at [phone] to:    Ask questions about your health    Make or cancel appointments    Discuss your medicines    Learn about your test results    Speak to your doctor            Additional Information About Your Visit        MyChart Information     [employer] gives you secure access to your electronic health record. If you see a primary care provider, you can also send messages to your care team and make appointments. If you have questions, please call your primary care clinic.  If you do not have a primary care provider, please call [phone] and they will assist you.      [employer] is an electronic gateway that provides easy, online access to your medical records. With [employer], you can request a clinic appointment, read your test results, renew a prescription or communicate with your care team.     To access your existing account, please contact your [facility] or call [phone] for assistance.        Care EveryWhere ID     This is your Care EveryWhere ID. This could be used by other organizations to access your [facility] medical records  [id number]        Your Vitals Were     Pulse Height BMI (Body Mass Index)             81 1.905 m (6' 3\") 31.3 kg/m2          Blood Pressure from Last 3 Encounters:   10/31/18 130/84   10/03/18 129/82   09/26/18 134/83    Weight from Last 3 Encounters:   10/31/18 113.6 kg (250 lb 6.4 oz)   10/03/18 112.5 kg (248 lb)   09/26/18 114.3 kg (252 lb)               Primary Care Provider Office Phone # Fax #    Lucinda DESHPANDE" MD Joey [phone]       [facility] PEDIATRICS [address] [facility] [address]        Equal Access to Services     NORA ANGELA : Hadii aad ku hadlizbetharthur Carlinejuan, aby bradenportia, qanilsta karayrayda yousif, mayur isaac laDevorahannetta luis. So [facility] [phone].    ATENCIÓN: Si habla español, tiene a bright disposición servicios gratuitos de asistencia lingüística. Llame al [phone].    We comply with applicable federal civil rights laws and [place] laws. We do not discriminate on the basis of race, color, national origin, age, disability, sex, sexual orientation, or gender identity.            Thank you!     Thank you for choosing [facility]  for your care. Our goal is always to provide you with excellent care. Hearing back from our patients is one way we can continue to improve our services. Please take a few minutes to complete the written survey that you may receive in the mail after your visit with us. Thank you!             Your Updated Medication List - Protect others around you: Learn how to safely use, store and throw away your medicines at www.disposemymeds.org.          This list is accurate as of 10/31/18 11:59 PM.  Always use your most recent med list.                   Brand Name Dispense Instructions for use Diagnosis    methylPREDNISolone 4 MG tablet    MEDROL DOSEPAK    21 tablet    Follow package instructions    Cervical radicular pain       naproxen 500 MG Tbec     30 tablet    Take 1 capsule by mouth 2 times daily    Acute pain of left shoulder, Cervical radicular pain       tiZANidine 4 MG tablet    ZANAFLEX    30 tablet    Take 1-2 tablets (4-8 mg) by mouth nightly as needed for muscle spasms    Acute pain of left shoulder, Cervical radicular pain       venlafaxine 150 MG 24 hr capsule    EFFEXOR-XR     Take by mouth daily

## 2018-10-31 NOTE — PROGRESS NOTES
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 Pain is worse when rowing or taking a deep breath. Has a history of right anterior 2nd rib stress fracture last year that resolved with rest. No SOB or palpitations. Right foot is feeling better.          Medications:     Current Outpatient Prescriptions   Medication Sig     methylPREDNISolone (MEDROL DOSEPAK) 4 MG tablet Follow package instructions (Patient not taking: Reported on 4/18/2018)     naproxen 500 MG TBEC Take 1 capsule by mouth 2 times daily (Patient not taking: Reported on 3/5/2018)     tiZANidine (ZANAFLEX) 4 MG tablet Take 1-2 tablets (4-8 mg) by mouth nightly as needed for muscle spasms (Patient not taking: Reported on 3/5/2018)     venlafaxine (EFFEXOR-XR) 150 MG 24 hr capsule Take by mouth daily     No current facility-administered medications for this visit.              Allergies:      Allergies   Allergen Reactions     Sulfa Drugs Difficulty breathing            Review of Systems:   A comprehensive 10 point review of systems (constitutional, ENT, cardiac, peripheral vascular, respiratory, GI, , Musculoskeletal, skin, Neurological) was performed and found to be negative except as described in this note.           Physical Exam:   COMPLETE EXAMINATION:   VITAL SIGNS: /84  Pulse 81  Ht 1.905 m (6' 3\")  Wt 113.6 kg (250 lb 6.4 oz)  BMI 31.3 kg/m2  GEN: Alert, well-nourished, and in no distress.   HEENT:   Head: Normocephalic and atraumatic.   Eyes: PERRLA, EOMI  CV: S1S2 normal, RRR, no murmur  CHEST: CTA, Easy effort, No rales or wheezes  ABD: Soft. Nontender/nondistended, no HSM/mass, no rebound/guarding.  NEURO: Alert and oriented to person, place, and time. Normal " reflexes. No cranial nerve deficit. Gait normal. Coordination normal.   SKIN: No rash, warmth or erythema  PSYCH: Mood, memory, affect and judgment normal.   MSK: TTP left 2nd rib, pain with rib compression; anteriorly rotated shoulders, poor posture; normal shoulder ROM pain          Imaging:     Left rib xrays obtained on 10/31/18 reveal no obvious or displaced fractures, no osseous abnormality        Assessment:     Left rib stress reaction        Plan:     Avoid UE impact and rowing for now.  Continue sx care measures.  Follow up in 2-3 weeks.    Aurora Ca

## 2018-10-31 NOTE — LETTER
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 Pain is worse when rowing or taking a deep breath. Has a history of right anterior 2nd rib stress fracture last year that resolved with rest. No SOB or palpitations. Right foot is feeling better.          Medications:     Current Outpatient Prescriptions   Medication Sig     methylPREDNISolone (MEDROL DOSEPAK) 4 MG tablet Follow package instructions (Patient not taking: Reported on 4/18/2018)     naproxen 500 MG TBEC Take 1 capsule by mouth 2 times daily (Patient not taking: Reported on 3/5/2018)     tiZANidine (ZANAFLEX) 4 MG tablet Take 1-2 tablets (4-8 mg) by mouth nightly as needed for muscle spasms (Patient not taking: Reported on 3/5/2018)     venlafaxine (EFFEXOR-XR) 150 MG 24 hr capsule Take by mouth daily     No current facility-administered medications for this visit.              Allergies:      Allergies   Allergen Reactions     Sulfa Drugs Difficulty breathing            Review of Systems:   A comprehensive 10 point review of systems (constitutional, ENT, cardiac, peripheral vascular, respiratory, GI, , Musculoskeletal, skin, Neurological) was performed and found to be negative except as described in this note.           Physical Exam:   COMPLETE EXAMINATION:   VITAL SIGNS: /84  Pulse 81  Ht 1.905 m (6' 3\")  Wt 113.6 kg (250 lb 6.4 oz)  BMI 31.3 kg/m2  GEN: Alert, well-nourished, and in no distress.   HEENT:   Head: Normocephalic and atraumatic.   Eyes: PERRLA, EOMI  CV: S1S2 normal, RRR, no murmur  CHEST: CTA, Easy effort, No rales or wheezes  ABD: Soft. Nontender/nondistended, " no HSM/mass, no rebound/guarding.  NEURO: Alert and oriented to person, place, and time. Normal reflexes. No cranial nerve deficit. Gait normal. Coordination normal.   SKIN: No rash, warmth or erythema  PSYCH: Mood, memory, affect and judgment normal.   MSK: TTP left 2nd rib, pain with rib compression; anteriorly rotated shoulders, poor posture; normal shoulder ROM pain          Imaging:     Left rib xrays obtained on 10/31/18 reveal no obvious or displaced fractures, no osseous abnormality        Assessment:     Left rib stress reaction        Plan:     Avoid UE impact and rowing for now.  Continue sx care measures.  Follow up in 2-3 weeks.    Aurora Ca MD

## 2018-11-26 DIAGNOSIS — R53.83 FATIGUE, UNSPECIFIED TYPE: Primary | ICD-10-CM

## 2018-11-28 ENCOUNTER — OFFICE VISIT (OUTPATIENT)
Dept: ORTHOPEDICS | Facility: CLINIC | Age: 18
End: 2018-11-28
Payer: COMMERCIAL

## 2018-11-28 VITALS
BODY MASS INDEX: 34.32 KG/M2 | HEIGHT: 72 IN | HEART RATE: 79 BPM | SYSTOLIC BLOOD PRESSURE: 135 MMHG | WEIGHT: 253.4 LBS | DIASTOLIC BLOOD PRESSURE: 91 MMHG

## 2018-11-28 DIAGNOSIS — R07.89 LEFT-SIDED CHEST WALL PAIN: Primary | ICD-10-CM

## 2018-11-28 NOTE — MR AVS SNAPSHOT
After Visit Summary   11/28/2018    Shante Lara    MRN: [id number]           Patient Information     Date Of Birth          2000        Visit Information        Provider Department      11/28/2018 3:45 PM Aurora Ca MD [facility]        Today's Diagnoses     Left-sided chest wall pain    -  1       Follow-ups after your visit        Your next 10 appointments already scheduled     Nov 30, 2018 11:00 AM CST   New Patient with Yadira Andrews PA-C   [facility] ([facility])    [address]  [address]  [facility] [id number] [phone]           Instruct patient to bring all current medications for their initial appointment.            Dec 03, 2018 10:00 AM CST   MR CHEST W/O CONTRAST with [id number]   [facility] ([facility])    [address]  [facility] [address] [phone]           How do I prepare for my exam? (Food and drink instructions) **If you will be receiving sedation or general anesthesia, please see special notes below.**  How do I prepare for my exam? (Other instructions) Take your medicines as usual, unless your doctor tells you not to. Please remove any body piercings and hair extensions before you arrive. Follow your doctor s orders. If you do not, we may have to postpone your exam. You may or may not receive IV contrast for this exam pending the discretion of the Radiologist.  You do not need to do anything special to prepare. **If you will be receiving sedation or general anesthesia, please see special notes below.**  What should I wear:  The MRI machine uses a strong magnet. Please wear clothes without metal (snaps, zippers). A sweatsuit works well, or we may give you a hospital gown.  How long does the exam take: Most tests take 30 to 60 minutes.  HOWEVER, IF YOUR DOCTOR PRESCRIBES ANESTHESIA please plan on  spending four to five hours in the recovery room.  What should I bring: Bring a list of your current medicines to your exam (including vitamins, minerals and over-the-counter drugs). Also bring the results of similar scans you may have had.  Do I need a : **If you will be receiving sedation or general anesthesia, please see special notes below.**  What should I do after the exam? No Restrictions, You may resume normal activities.  What is this test: MRI (magnetic resonance imaging) uses a strong magnet and radio waves to look inside the body. An MRA (magnetic resonance angiogram) does the same thing, but it lets us look at your blood vessels. A computer turns the radio waves into pictures showing cross sections of the body, much like slices of bread. This helps us see any problems more clearly.  Who should I call with questions: Please call the Imaging Department at your exam site with any questions. Directions, parking instructions, and other information is available on our website, [email or web address].  How do I prepare if I m having sedation or anesthesia? **IMPORTANT** THE INSTRUCTIONS BELOW ARE ONLY FOR THOSE PATIENTS WHO HAVE BEEN TOLD THEY WILL RECEIVE SEDATION OR GENERAL ANESTHESIA DURING THEIR MRI PROCEDURE:  IF YOU WILL RECEIVE SEDATION (take medicine to help you relax during your exam): You must get the medicine from your doctor before you arrive. Bring the medicine to the exam. Do not take it at home. Arrive one hour early. Bring someone who can take you home after the test. Your medicine will make you sleepy. After the exam, you may not drive, take a bus or take a taxi by yourself. No eating 8 hours before your exam. You may have clear liquids up until 4 hours before your exam. (Clear liquids include water, clear tea, black coffee and fruit juice without pulp.)  IF YOU WILL RECEIVE ANESTHESIA (be asleep for your exam): Arrive 1 1/2 hours early. Bring someone who can take you home after the test.  "You may not drive, take a bus or take a taxi by yourself. No eating 8 hours before your exam. You may have clear liquids up until 4 hours before your exam. (Clear liquids include water, clear tea, black coffee and fruit juice without pulp.) You will spend four to five hours in the recovery room.              Future tests that were ordered for you today     Open Future Orders        Priority Expected Expires Ordered    MR Chest w/o Contrast Routine  11/28/2019 11/28/2018            Who to contact     Please call your clinic at [phone] to:    Ask questions about your health    Make or cancel appointments    Discuss your medicines    Learn about your test results    Speak to your doctor            Additional Information About Your Visit        Comeks Information     Comeks gives you secure access to your electronic health record. If you see a primary care provider, you can also send messages to your care team and make appointments. If you have questions, please call your primary care clinic.  If you do not have a primary care provider, please call [phone] and they will assist you.      Comeks is an electronic gateway that provides easy, online access to your medical records. With Comeks, you can request a clinic appointment, read your test results, renew a prescription or communicate with your care team.     To access your existing account, please contact your [facility] or call [phone] for assistance.        Care EveryWhere ID     This is your Care EveryWhere ID. This could be used by other organizations to access your [place] medical records  [id number]        Your Vitals Were     Pulse Height Last Period BMI (Body Mass Index)          79 1.905 m (6' 3\") 11/25/2018 31.67 kg/m2         Blood Pressure from Last 3 Encounters:   11/28/18 (!) 135/91   10/31/18 130/84   10/03/18 129/82    Weight from Last 3 Encounters:   11/28/18 114.9 kg (253 lb 6.4 oz)   10/31/18 113.6 " kg (250 lb 6.4 oz)   10/03/18 112.5 kg (248 lb)               Primary Care Provider Office Phone # Fax #    Yadira Shannon Andrews PA-C [phone] [address] [address]        Equal Access to Services     [facility] : Hadii aad ku hadasho Soomaali, waaxda luqadaha, qaybta kaalmada adeegyada, waxay idiin hayaan adeeg khhollandsh la'aan . So [facility] [phone].    ATENCIÓN: Si habla español, tiene a bright disposición servicios gratuitos de asistencia lingüística. [facility] [phone].    We comply with applicable federal civil rights laws and [place] laws. We do not discriminate on the basis of race, color, national origin, age, disability, sex, sexual orientation, or gender identity.            Thank you!     Thank you for choosing [facility]  for your care. Our goal is always to provide you with excellent care. Hearing back from our patients is one way we can continue to improve our services. Please take a few minutes to complete the written survey that you may receive in the mail after your visit with us. Thank you!             Your Updated Medication List - Protect others around you: Learn how to safely use, store and throw away your medicines at www.disposemymeds.org.          This list is accurate as of 11/28/18  8:14 PM.  Always use your most recent med list.                   Brand Name Dispense Instructions for use Diagnosis    methylPREDNISolone 4 MG tablet    MEDROL DOSEPAK    21 tablet    Follow package instructions    Cervical radicular pain       naproxen 500 MG EC tablet    NAPROSYN DR    30 tablet    Take 1 capsule by mouth 2 times daily    Acute pain of left shoulder, Cervical radicular pain       tiZANidine 4 MG tablet    ZANAFLEX    30 tablet    Take 1-2 tablets (4-8 mg) by mouth nightly as needed for muscle spasms    Acute pain of left shoulder, Cervical radicular pain       venlafaxine 150 MG 24 hr capsule    EFFEXOR-XR     Take by  mouth daily

## 2018-11-28 NOTE — LETTER
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 She took a 4 week period of rest, but sx persist. Prior rib xrays were normal. No radiating pain or associated numbness or tingling.          Medications:     Current Outpatient Prescriptions   Medication Sig     venlafaxine (EFFEXOR-XR) 150 MG 24 hr capsule Take by mouth daily     methylPREDNISolone (MEDROL DOSEPAK) 4 MG tablet Follow package instructions (Patient not taking: Reported on 4/18/2018)     naproxen 500 MG TBEC Take 1 capsule by mouth 2 times daily (Patient not taking: Reported on 3/5/2018)     tiZANidine (ZANAFLEX) 4 MG tablet Take 1-2 tablets (4-8 mg) by mouth nightly as needed for muscle spasms (Patient not taking: Reported on 3/5/2018)     No current facility-administered medications for this visit.              Allergies:      Allergies   Allergen Reactions     Sulfa Drugs Difficulty breathing            Review of Systems:   A comprehensive 10 point review of systems (constitutional, ENT, cardiac, peripheral vascular, respiratory, GI, , Musculoskeletal, skin, Neurological) was performed and found to be negative except as described in this note.           Physical Exam:   COMPLETE EXAMINATION:   VITAL SIGNS: BP (!) 135/91  Pulse 79  Ht 1.905 m (6' 3\")  Wt 114.9 kg (253 lb 6.4 oz)  LMP 11/25/2018  BMI 31.67 kg/m2  GEN: Alert, well-nourished, and in no distress.   Neck: supple, no lymphadenopathy  CV: S1S2 normal, RRR, no murmur  CHEST: CTA, Easy effort, No rales or wheezes  ABD: Soft. Nontender/nondistended, no HSM/mass, no rebound/guarding.  NEURO: Alert and oriented to person, " place, and time. Gait normal.   SKIN: No rash, warmth or erythema  PSYCH: Mood, memory, affect and judgment normal.   MSK: TTP over left anterior chest wall at the level of the 3-4th rib with maximal tenderness in the intercostal region, mild TTP at the sternocostal junction here, pain increases with deep breath, no SOB or dyspnea          Assessment:     Persistent Left anterior chest wall pain - costochondritis vs rib stress fracture or intercostal muscle strain/neuritis        Plan:     Recommended obtaining an MRI of the chest to rule out rib stress reaction, intercostal muscle strain, or other internal derangement. Follow up once complete.    DRAKE Carmona was present for the entire visit.      Aurora Ca MD

## 2018-11-29 NOTE — PROGRESS NOTES
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 She took a 4 week period of rest, but sx persist. Prior rib xrays were normal. No radiating pain or associated numbness or tingling.          Medications:     Current Outpatient Prescriptions   Medication Sig     venlafaxine (EFFEXOR-XR) 150 MG 24 hr capsule Take by mouth daily     methylPREDNISolone (MEDROL DOSEPAK) 4 MG tablet Follow package instructions (Patient not taking: Reported on 4/18/2018)     naproxen 500 MG TBEC Take 1 capsule by mouth 2 times daily (Patient not taking: Reported on 3/5/2018)     tiZANidine (ZANAFLEX) 4 MG tablet Take 1-2 tablets (4-8 mg) by mouth nightly as needed for muscle spasms (Patient not taking: Reported on 3/5/2018)     No current facility-administered medications for this visit.              Allergies:      Allergies   Allergen Reactions     Sulfa Drugs Difficulty breathing            Review of Systems:   A comprehensive 10 point review of systems (constitutional, ENT, cardiac, peripheral vascular, respiratory, GI, , Musculoskeletal, skin, Neurological) was performed and found to be negative except as described in this note.           Physical Exam:   COMPLETE EXAMINATION:   VITAL SIGNS: BP (!) 135/91  Pulse 79  Ht 1.905 m (6' 3\")  Wt 114.9 kg (253 lb 6.4 oz)  LMP 11/25/2018  BMI 31.67 kg/m2  GEN: Alert, well-nourished, and in no distress.   Neck: supple, no lymphadenopathy  CV: S1S2 normal, RRR, no murmur  CHEST: CTA, Easy effort, No rales or wheezes  ABD: Soft. Nontender/nondistended, no HSM/mass, no rebound/guarding.  NEURO: Alert and oriented to person, place, and time. Gait normal.   SKIN: No rash, warmth or erythema  PSYCH: Mood, memory, affect and " judgment normal.   MSK: TTP over left anterior chest wall at the level of the 3-4th rib with maximal tenderness in the intercostal region, mild TTP at the sternocostal junction here, pain increases with deep breath, no SOB or dyspnea          Assessment:     Persistent Left anterior chest wall pain - costochondritis vs rib stress fracture or intercostal muscle strain/neuritis        Plan:     Recommended obtaining an MRI of the chest to rule out rib stress reaction, intercostal muscle strain, or other internal derangement. Follow up once complete.    DRAKE Carmona was present for the entire visit.

## 2018-11-30 ENCOUNTER — OFFICE VISIT (OUTPATIENT)
Dept: FAMILY MEDICINE | Facility: CLINIC | Age: 18
End: 2018-11-30

## 2018-11-30 VITALS
WEIGHT: 250 LBS | OXYGEN SATURATION: 99 % | SYSTOLIC BLOOD PRESSURE: 122 MMHG | TEMPERATURE: 98.4 F | HEIGHT: 72 IN | HEART RATE: 74 BPM | DIASTOLIC BLOOD PRESSURE: 70 MMHG | BODY MASS INDEX: 33.86 KG/M2

## 2018-11-30 DIAGNOSIS — R42 DIZZINESS: ICD-10-CM

## 2018-11-30 DIAGNOSIS — Z71.89 ACP (ADVANCE CARE PLANNING): ICD-10-CM

## 2018-11-30 DIAGNOSIS — Z30.41 ENCOUNTER FOR SURVEILLANCE OF CONTRACEPTIVE PILLS: Primary | ICD-10-CM

## 2018-11-30 DIAGNOSIS — Z76.89 HEALTH CARE HOME: ICD-10-CM

## 2018-11-30 DIAGNOSIS — Z23 NEED FOR VACCINATION: ICD-10-CM

## 2018-11-30 PROCEDURE — 90471 IMMUNIZATION ADMIN: CPT | Performed by: PHYSICIAN ASSISTANT

## 2018-11-30 PROCEDURE — 90715 TDAP VACCINE 7 YRS/> IM: CPT | Performed by: PHYSICIAN ASSISTANT

## 2018-11-30 PROCEDURE — 99202 OFFICE O/P NEW SF 15 MIN: CPT | Mod: 25 | Performed by: PHYSICIAN ASSISTANT

## 2018-11-30 RX ORDER — VENLAFAXINE HYDROCHLORIDE 37.5 MG/1
37.5 CAPSULE, EXTENDED RELEASE ORAL DAILY
Qty: 90 CAPSULE | Refills: 3 | Status: SHIPPED | OUTPATIENT
Start: 2018-11-30 | End: 2019-05-22

## 2018-11-30 RX ORDER — NORGESTIMATE AND ETHINYL ESTRADIOL 0.25-0.035
1 KIT ORAL DAILY
Qty: 84 TABLET | Refills: 3 | Status: SHIPPED | OUTPATIENT
Start: 2018-11-30 | End: 2019-05-22

## 2018-11-30 RX ORDER — NORGESTIMATE AND ETHINYL ESTRADIOL 0.25-0.035
1 KIT ORAL DAILY
COMMUNITY
End: 2018-11-30

## 2018-11-30 RX ORDER — VENLAFAXINE HYDROCHLORIDE 75 MG/1
75 CAPSULE, EXTENDED RELEASE ORAL DAILY
Qty: 90 CAPSULE | Refills: 3 | Status: SHIPPED | OUTPATIENT
Start: 2018-11-30 | End: 2019-05-22

## 2018-11-30 NOTE — MR AVS SNAPSHOT
After Visit Summary   11/30/2018    Shante Lara    MRN: [id number]           Patient Information     Date Of Birth          2000        Visit Information        Provider Department      11/30/2018 11:00 AM Yadira Andrews PA-C [facility]        Today's Diagnoses     Encounter for surveillance of contraceptive pills    -  1    Dizziness        Need for vaccination        Health Care Home        ACP (advance care planning)           Follow-ups after your visit        Follow-up notes from your care team     Return in about 1 year (around 11/30/2019) for Physical Exam.      Your next 10 appointments already scheduled     Dec 03, 2018 10:00 AM CST   MR CHEST W/O CONTRAST with [id number]   [facility] ([facility])    [address] [address] [phone]           How do I prepare for my exam? (Food and drink instructions) **If you will be receiving sedation or general anesthesia, please see special notes below.**  How do I prepare for my exam? (Other instructions) Take your medicines as usual, unless your doctor tells you not to. Please remove any body piercings and hair extensions before you arrive. Follow your doctor s orders. If you do not, we may have to postpone your exam. You may or may not receive IV contrast for this exam pending the discretion of the Radiologist.  You do not need to do anything special to prepare. **If you will be receiving sedation or general anesthesia, please see special notes below.**  What should I wear:  The MRI machine uses a strong magnet. Please wear clothes without metal (snaps, zippers). A sweatsuit works well, or we may give you a hospital gown.  How long does the exam take: Most tests take 30 to 60 minutes.  HOWEVER, IF YOUR DOCTOR PRESCRIBES ANESTHESIA please plan on spending four to five hours in the recovery room.  What should I bring: Bring a list  of your current medicines to your exam (including vitamins, minerals and over-the-counter drugs). Also bring the results of similar scans you may have had.  Do I need a : **If you will be receiving sedation or general anesthesia, please see special notes below.**  What should I do after the exam? No Restrictions, You may resume normal activities.  What is this test: MRI (magnetic resonance imaging) uses a strong magnet and radio waves to look inside the body. An MRA (magnetic resonance angiogram) does the same thing, but it lets us look at your blood vessels. A computer turns the radio waves into pictures showing cross sections of the body, much like slices of bread. This helps us see any problems more clearly.  Who should I call with questions: Please call the Imaging Department at your exam site with any questions. Directions, parking instructions, and other information is available on our website, Where's Up/imaging.  How do I prepare if I m having sedation or anesthesia? **IMPORTANT** THE INSTRUCTIONS BELOW ARE ONLY FOR THOSE PATIENTS WHO HAVE BEEN TOLD THEY WILL RECEIVE SEDATION OR GENERAL ANESTHESIA DURING THEIR MRI PROCEDURE:  IF YOU WILL RECEIVE SEDATION (take medicine to help you relax during your exam): You must get the medicine from your doctor before you arrive. Bring the medicine to the exam. Do not take it at home. Arrive one hour early. Bring someone who can take you home after the test. Your medicine will make you sleepy. After the exam, you may not drive, take a bus or take a taxi by yourself. No eating 8 hours before your exam. You may have clear liquids up until 4 hours before your exam. (Clear liquids include water, clear tea, black coffee and fruit juice without pulp.)  IF YOU WILL RECEIVE ANESTHESIA (be asleep for your exam): Arrive 1 1/2 hours early. Bring someone who can take you home after the test. You may not drive, take a bus or take a taxi by yourself. No eating 8 hours before  "your exam. You may have clear liquids up until 4 hours before your exam. (Clear liquids include water, clear tea, black coffee and fruit juice without pulp.) You will spend four to five hours in the recovery room.              Who to contact     If you have questions or need follow up information about today's clinic visit or your schedule please contact [facility] directly at [phone].  Normal or non-critical lab and imaging results will be communicated to you by Keycoopthart, letter or phone within 4 business days after the clinic has received the results. If you do not hear from us within 7 days, please contact the clinic through Cotyt or phone. If you have a critical or abnormal lab result, we will notify you by phone as soon as possible.  Submit refill requests through [employer] or call your pharmacy and they will forward the refill request to us. Please allow 3 business days for your refill to be completed.          Additional Information About Your Visit        [employer] Information     [employer] gives you secure access to your electronic health record. If you see a primary care provider, you can also send messages to your care team and make appointments. If you have questions, please call your primary care clinic.  If you do not have a primary care provider, please call [phone] and they will assist you.        Care EveryWhere ID     This is your Care EveryWhere ID. This could be used by other organizations to access your [place] medical records  [id number]        Your Vitals Were     Pulse Temperature Height Last Period Pulse Oximetry BMI (Body Mass Index)    74 98.4  F (36.9  C) (Oral) 1.88 m (6' 2\") 11/25/2018 99% 32.1 kg/m2       Blood Pressure from Last 3 Encounters:   11/30/18 122/70   11/28/18 (!) 135/91   10/31/18 130/84    Weight from Last 3 Encounters:   11/30/18 113.4 kg (250 lb)   11/28/18 114.9 kg (253 lb 6.4 oz)   10/31/18 113.6 kg (250 lb 6.4 oz)              We " Performed the Following     TDAP VACCINE (BOOSTRIX)     VACCINE ADMINISTRATION, INITIAL          Today's Medication Changes          These changes are accurate as of 11/30/18 11:59 PM.  If you have any questions, ask your nurse or doctor.               Start taking these medicines.        Dose/Directions    * venlafaxine 37.5 MG 24 hr capsule   Commonly known as:  EFFEXOR XR   Used for:  Dizziness   Started by:  Yadira Andrews PA-C        Dose:  37.5 mg   Take 1 capsule (37.5 mg) by mouth daily   Quantity:  90 capsule   Refills:  3       * venlafaxine 75 MG 24 hr capsule   Commonly known as:  EFFEXOR XR   Used for:  Dizziness   Started by:  Yadira Andrews PA-C        Dose:  75 mg   Take 1 capsule (75 mg) by mouth daily   Quantity:  90 capsule   Refills:  3       * Notice:  This list has 2 medication(s) that are the same as other medications prescribed for you. Read the directions carefully, and ask your doctor or other care provider to review them with you.         Where to get your medicines      These medications were sent to [id number] - [place], [place] - [address] [address]   [address] [address] , [facility] [address]     Phone:  [phone]     norgestimate-ethinyl estradiol 0.25-35 MG-MCG tablet    venlafaxine 37.5 MG 24 hr capsule    venlafaxine 75 MG 24 hr capsule                Primary Care Provider Office Phone # Fax #    Yadira Andrews PA-C [phone] [address] [facility] [address]  [facility] [address]        Equal Access to Services     [facility] AH: Hadii aad ku hadasho Soomaali, waaxda luqadaha, qaybta kaalmada adeegyada, waxay demarcus corrales . So [facility] [phone].    ATENCIÓN: Si habla español, tiene a bright disposición servicios gratuitos de asistencia lingüística. Llame al [phone].    We comply with applicable federal civil rights laws and [place] laws. We do not discriminate on the basis of race, color, national origin, age,  disability, sex, sexual orientation, or gender identity.            Thank you!     Thank you for choosing [facility]  for your care. Our goal is always to provide you with excellent care. Hearing back from our patients is one way we can continue to improve our services. Please take a few minutes to complete the written survey that you may receive in the mail after your visit with us. Thank you!             Your Updated Medication List - Protect others around you: Learn how to safely use, store and throw away your medicines at www.disposemymeds.org.          This list is accurate as of 11/30/18 11:59 PM.  Always use your most recent med list.                   Brand Name Dispense Instructions for use Diagnosis    norgestimate-ethinyl estradiol 0.25-35 MG-MCG tablet    ORTHO-CYCLEN/SPRINTEC    84 tablet    Take 1 tablet by mouth daily    Encounter for surveillance of contraceptive pills       * venlafaxine 37.5 MG 24 hr capsule    EFFEXOR XR    90 capsule    Take 1 capsule (37.5 mg) by mouth daily    Dizziness       * venlafaxine 75 MG 24 hr capsule    EFFEXOR XR    90 capsule    Take 1 capsule (75 mg) by mouth daily    Dizziness       * Notice:  This list has 2 medication(s) that are the same as other medications prescribed for you. Read the directions carefully, and ask your doctor or other care provider to review them with you.

## 2018-11-30 NOTE — PROGRESS NOTES
"CC: Medication Check    History:    Would like refills of her ortho-triclcyen. She is tolerating this well, denies side effects, and feels that it works well to regulate menses.  1. Do you or anyone in your family have a history of blood clots? No  2. Do you have a history of migraine with aura?  No  3. Do you smoke?  No  4. Do you have a history of hypertension?  No    Started venlafaxine from neurologist during rona year of high school 3-4 years ago for dizziness. Started at 75 mg then increased to 75 + 37.5 mg. Then tried 150 mg daily, but overall didn't feel good including nausea, so went back down to 37.5 mg + 75 mg.     PMH, MEDICATIONS, ALLERGIES, SOCIAL AND FAMILY HISTORY in [facility] and reviewed by me personally.      ROS negative other than the symptoms noted above in the HPI.        Examination   /70 (BP Location: Left arm, Patient Position: Sitting, Cuff Size: Adult Large)  Pulse 74  Temp 98.4  F (36.9  C) (Oral)  Ht 1.88 m (6' 2\")  Wt 113.4 kg (250 lb)  LMP 11/25/2018  SpO2 99%  BMI 32.1 kg/m2       Constitutional: Sitting comfortably, in no acute distress. Vital signs noted  Eyes: pupils equal round reactive to light and accomodation, extra ocular movements intact  Neck:  no adenopathy, trachea midline and normal to palpation  Cardiovascular:  regular rate and rhythm, no murmurs, clicks, or gallops  Respiratory:  normal respiratory rate and rhythm, lungs clear to auscultation  NEURO:  cranial nerves 2-12 intact, muscle strength normal, reflexes normal and symmetric  SKIN: No jaundice/pallor/rash.   Psychiatric: mentation appears normal and affect normal/bright        A/P    ICD-10-CM    1. Encounter for surveillance of contraceptive pills Z30.41 norgestimate-ethinyl estradiol (ORTHO-CYCLEN/SPRINTEC) 0.25-35 MG-MCG tablet   2. Dizziness R42 venlafaxine (EFFEXOR XR) 37.5 MG 24 hr capsule     venlafaxine (EFFEXOR XR) 75 MG 24 hr capsule   3. Need for vaccination Z23 VACCINE ADMINISTRATION, " INITIAL     TDAP VACCINE (BOOSTRIX)     CANCELED: C TD PRSERV FREE >=7 YRS ADS IM     CANCELED: VACCINE ADMINISTRATION, INITIAL   4. Health Care Home Z76.89    5. ACP (advance care planning) Z71.89        DISCUSSION:  Shante is doing well today on both medications. Will refill both without change for 1 year. She should continue to monitor for side effects, and contact me if noted. She is due for update of tetanus vaccine, which she will complete today. Will be due for 1st pap with next years refills.     follow up visit: 1 year    Yadira Andrews PA-C  [facility]

## 2018-11-30 NOTE — LETTER
[facility]  [address]  [address]  [facility] [address]  [phone]      November 30, 2018      Shante Lara  [address] [place], [place] [address]   [facility] [address]      EMERGENCY CARE PLAN  Presenting Problem Treatment Plan   Questions or concerns during clinic hours I will call the clinic directly:    [facility]  [facility] of [facility]  [address]  [place], [place] [address] [phone]   Questions or concerns outside clinic hours  I will call the 24 hour line at [phone]   Patient needs to schedule an appointment  I will call the  scheduling line at [phone]   Same day treatment   I will call the clinic first, then  urgent care and/or  express care if needed   Clinic Care Coordinators Bonnie Sawyer, RN:  [phone]  P Clinical Support Staff: [phone]    Crisis Services:  Behavioral or Mental Health BHP (Behavioral Health Providers)   [phone]   Emergency treatment--Immediately CALL [phone]

## 2018-11-30 NOTE — NURSING NOTE
Shante is here to establish care and for a consult on medication          Pre-visit Screening:  Immunizations:  up to date  Colonoscopy:  NA  Mammogram: NA  Asthma Action Test/Plan:  NA  PHQ9:  None  GAD7:  None  Questioned patient about current smoking habits Pt. has never smoked.  Ok to leave detailed message on voice mail for today's visit only Yes, phone # [phone]

## 2018-12-02 PROBLEM — R42 DIZZINESS: Status: ACTIVE | Noted: 2018-12-02

## 2018-12-03 ENCOUNTER — RADIANT APPOINTMENT (OUTPATIENT)
Dept: MRI IMAGING | Facility: CLINIC | Age: 18
End: 2018-12-03
Attending: PEDIATRICS
Payer: COMMERCIAL

## 2018-12-03 DIAGNOSIS — R07.89 LEFT-SIDED CHEST WALL PAIN: ICD-10-CM

## 2018-12-05 ENCOUNTER — OFFICE VISIT (OUTPATIENT)
Dept: ORTHOPEDICS | Facility: CLINIC | Age: 18
End: 2018-12-05
Payer: COMMERCIAL

## 2018-12-05 VITALS
DIASTOLIC BLOOD PRESSURE: 86 MMHG | HEART RATE: 79 BPM | WEIGHT: 250 LBS | BODY MASS INDEX: 33.86 KG/M2 | HEIGHT: 72 IN | SYSTOLIC BLOOD PRESSURE: 150 MMHG

## 2018-12-05 DIAGNOSIS — R53.83 FATIGUE, UNSPECIFIED TYPE: ICD-10-CM

## 2018-12-05 DIAGNOSIS — G54.0 THORACIC OUTLET SYNDROME: Primary | ICD-10-CM

## 2018-12-05 LAB
FERRITIN SERPL-MCNC: 21 NG/ML (ref 12–150)
HGB BLD-MCNC: 12.8 G/DL (ref 11.7–15.7)

## 2018-12-05 NOTE — MR AVS SNAPSHOT
"              After Visit Summary   12/5/2018    Shante Lara    MRN: [id number]           Patient Information     Date Of Birth          2000        Visit Information        Provider Department      12/5/2018 3:45 PM Aurora Ca MD [facility]        Today's Diagnoses     Thoracic outlet syndrome    -  1       Follow-ups after your visit        Who to contact     Please call your clinic at [phone] to:    Ask questions about your health    Make or cancel appointments    Discuss your medicines    Learn about your test results    Speak to your doctor            Additional Information About Your Visit        MyChart Information     [employer] gives you secure access to your electronic health record. If you see a primary care provider, you can also send messages to your care team and make appointments. If you have questions, please call your primary care clinic.  If you do not have a primary care provider, please call [phone] and they will assist you.      [employer] is an electronic gateway that provides easy, online access to your medical records. With [employer], you can request a clinic appointment, read your test results, renew a prescription or communicate with your care team.     To access your existing account, please contact your [facility] or call [phone] for assistance.        Care EveryWhere ID     This is your Care EveryWhere ID. This could be used by other organizations to access your [place] medical records  [id number]        Your Vitals Were     Pulse Height Last Period BMI (Body Mass Index)          79 1.88 m (6' 2.02\") 11/25/2018 32.08 kg/m2         Blood Pressure from Last 3 Encounters:   12/05/18 150/86   11/30/18 122/70   11/28/18 (!) 135/91    Weight from Last 3 Encounters:   12/05/18 113.4 kg (250 lb)   11/30/18 113.4 kg (250 lb)   11/28/18 114.9 kg (253 lb 6.4 oz)              Today, you had the following     No " orders found for display       Primary Care Provider Office Phone # Fax #    Yadira Shannon Andrews PA-C [phone] [address] [facility] [address]  [facility] [address]        Equal Access to Services     NORA ANGELA : Hadii aad ku hadlizbetho Soomaali, waaxda luqadaha, qaybta kaalmada adeegyada, waxhardeep fairbanksn adenicolas isaac laDevorahannetta smith. So [facility] [phone].    ATENCIÓN: Si habla español, tiene a bright disposición servicios gratuitos de asistencia lingüística. Ll[facility] [phone].    We comply with applicable federal civil rights laws and [place] laws. We do not discriminate on the basis of race, color, national origin, age, disability, sex, sexual orientation, or gender identity.            Thank you!     Thank you for choosing [facility]  for your care. Our goal is always to provide you with excellent care. Hearing back from our patients is one way we can continue to improve our services. Please take a few minutes to complete the written survey that you may receive in the mail after your visit with us. Thank you!             Your Updated Medication List - Protect others around you: Learn how to safely use, store and throw away your medicines at www.disposemymeds.org.          This list is accurate as of 12/5/18  6:55 PM.  Always use your most recent med list.                   Brand Name Dispense Instructions for use Diagnosis    norgestimate-ethinyl estradiol 0.25-35 MG-MCG tablet    ORTHO-CYCLEN/SPRINTEC    84 tablet    Take 1 tablet by mouth daily    Encounter for surveillance of contraceptive pills       * venlafaxine 37.5 MG 24 hr capsule    EFFEXOR XR    90 capsule    Take 1 capsule (37.5 mg) by mouth daily    Dizziness       * venlafaxine 75 MG 24 hr capsule    EFFEXOR XR    90 capsule    Take 1 capsule (75 mg) by mouth daily    Dizziness       * Notice:  This list has 2 medication(s) that are the same as other medications prescribed for you. Read the directions  carefully, and ask your doctor or other care provider to review them with you.

## 2018-12-06 NOTE — PROGRESS NOTES
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 She continues to have left anterior chest wall pain that has continued despite rest from rowing and upper body exercises. Now, she has noticed some pain radiating into left arm with numbness and tingling. No temperature or color changes.          Medications:     Current Outpatient Prescriptions   Medication Sig     norgestimate-ethinyl estradiol (ORTHO-CYCLEN/SPRINTEC) 0.25-35 MG-MCG tablet Take 1 tablet by mouth daily     venlafaxine (EFFEXOR XR) 37.5 MG 24 hr capsule Take 1 capsule (37.5 mg) by mouth daily     venlafaxine (EFFEXOR XR) 75 MG 24 hr capsule Take 1 capsule (75 mg) by mouth daily     No current facility-administered medications for this visit.              Allergies:      Allergies   Allergen Reactions     Sulfa Drugs Difficulty breathing            Review of Systems:   A comprehensive 10 point review of systems (constitutional, ENT, cardiac, peripheral vascular, respiratory, GI, , Musculoskeletal, skin, Neurological) was performed and found to be negative except as described in this note.           Physical Exam:   COMPLETE EXAMINATION:   VITAL SIGNS: /86  Pulse 79  Ht 1.88 m (6' 2.02\")  Wt 113.4 kg (250 lb)  LMP 11/25/2018  BMI 32.08 kg/m2  GEN: Alert, well-nourished, and in no distress.   NEURO: Alert and oriented to person, place, and time. Gait normal.   SKIN: No rash, warmth or erythema  PSYCH: Mood, memory, affect and judgment normal.   MSK: Left shoulder: anteriorly rotated with scapular dyskinesis, TTP anterior chest wall; normal ROM, cuff strength intact; Herbert and Amaya's tests positive, Adson's negative         Imaging:     MRI left ribs:no definite findings to suggest stress changes in the " visualized left upper  anterior ribs. No surrounding soft tissue muscular edema or abnormalities in the cartilage of the ribs.          Assessment:     Left anterior chest pain now with exam findings c/w thoracic outlet syndrome, no evidence for rib stress fracture        Plan:     1. Reviewed normal MRI results with athlete.  2. Referral to PT experienced with TOS  3. Continue activity modifications and sx care measures.   4. Follow up in 4 weeks.    Aurora Carmona was present for the entire visit.

## 2018-12-19 ENCOUNTER — THERAPY VISIT (OUTPATIENT)
Dept: PHYSICAL THERAPY | Facility: CLINIC | Age: 18
End: 2018-12-19
Payer: COMMERCIAL

## 2018-12-19 DIAGNOSIS — G54.0 THORACIC OUTLET SYNDROME: ICD-10-CM

## 2018-12-19 PROCEDURE — 97162 PT EVAL MOD COMPLEX 30 MIN: CPT | Mod: GP | Performed by: PHYSICAL THERAPIST

## 2018-12-19 PROCEDURE — 97140 MANUAL THERAPY 1/> REGIONS: CPT | Mod: GP | Performed by: PHYSICAL THERAPIST

## 2018-12-19 PROCEDURE — 97110 THERAPEUTIC EXERCISES: CPT | Mod: GP | Performed by: PHYSICAL THERAPIST

## 2018-12-19 NOTE — PROGRESS NOTES
[place] [facility] Initial Evaluation  Subjective:  HPI                    Objective:  System    Physical Exam    [facility]: Physical Therapy Initial Evaluation   Dec 19, 2018  Precautions/Restrictions/Physician instructions:   Per Orders: Referral to PT experienced with TOS      Subjective:   Chief Complaint:    Pain: Left anterior chest wall   Numbness/Tingling: in the left arm   Weakness: some loss of left  strength   Stiffness: None  New/Recurrent/Chronic: New  DOI/onset: Mid-October 2018  Referral Date: 12/17/2018 - Aurora Ca MD  Mechanism of onset: Unknown, thinks it was caused by rowing.   PMH/surgical history/trauma:    - Migraines with dizziness (Started in [employer] year of highschool, diagnosed with PPPD - persistent postural and perceptive dizziness)   - History of a fractured rib on the right (2nd rib)   General health as reported by patient: Excellent    Medications: Anti-depressants (for dizziness)  Occupation: Student   Previous Treatment (Effect): rest (not really any improvement), advil/tylenol (not helpful),   Imaging: MRI - IMPRESSION: Limited evaluation of the left upper ribs with no definite findings to suggest stress changes in the visualized left upper anterior ribs. No surrounding soft tissue muscular edema or abnormalities in the cartilage of the ribs.  AM/PM: No different throughout the day  Quality of Pain: dull, achy  Pain: 1/10 at present, 0/10 at best, 10/10 at worst  Worse: breathing, overhead lifting  Better: stop working as hard,   Progression of Symptoms since onset: better, but still there   Sleeping: No disturbance   Current Functional Status: sitting - still has an aching ; workouts - worse with increased breathing, push-ups,   Previous Functional Status: No restrictions prior  Current HEP/exercise regimen: rowing, lifting, running,   Hand/Leg Dominance: right-handed  Transportation to Therapy: Independent  with transportation  Red Flags:   - Patient denies the following: Pain with Cough / Sneeze / Laughing ; Night Pain ;   - Patient reports the following: Weakness ; Numbness/Tingling ; Chest Pain (chief complaint) ;     Patient's Goal(s): Make it feel better.       Objective:    Posture: Forward head posture, rounded shoulders    Shoulder Screen: pulling with left arm elevations    Cervical AROM: (Major, Moderate, Minimal or Nil loss)  Movement Loss Tee Mod Min Nil Pain   Flexion    x    Extension    x cc   Rotation L    x cc   Rotation R    x cc   Side Bend L    x    Side Bend R    x cc   Retraction   x         Thoracic AROM: (Major, Moderate, Minimal or Nil loss)  Movement Loss Tee Mod Min Nil Pain   Flexion    x    Extension    x    Rotation L  x      Rotation R    x    Side Bend L    x    Side Bend R    x      Ribs Screen: Generally less mobility of the left ribs compared to the right with inspiration.     Thoracic Mobility/Spring Testing: Stiffness of T1-T3.     Palpation: Palpation to pec minor and to the scalene regions generated some numbness in the left upper extremity.         Assessment/Plan:    Patient is a 18 year old female with chest and left upper extremity complaints.    Patient has the following significant findings with corresponding treatment plan.                Referring Diagnosis: Thoracic Outlet Syndrome  Pain -  hot/cold therapy, manual therapy, splint/taping/bracing/orthotics, self management, education and home program  Decreased ROM/flexibility - manual therapy, therapeutic exercise, therapeutic activity and home program  Decreased joint mobility - manual therapy, therapeutic exercise, therapeutic activity and home program  Decreased function - therapeutic activities and home program  Impaired posture - neuro re-education, therapeutic activities and home program      Therapy Evaluation Codes:   1) History comprised of:   Personal factors that impact the plan of care:      Athlete in Rowing  Crew.    Comorbidity factors that impact the plan of care are:      Dizziness and Migraines/headaches.     Medications impacting care: Anti-depressant.  2) Examination of Body Systems comprised of:   Body structures and functions that impact the plan of care:      Cervical spine, Shoulder, Thoracic Spine and Ribs.   Activity limitations that impact the plan of care are:      Breathing, Workouts and Sitting.  3) Clinical presentation characteristics are:   Evolving/Changing.  4) Decision-Making    Moderate complexity using standardized patient assessment instrument and/or measureable assessment of functional outcome.  Cumulative Therapy Evaluation is: Moderate complexity.    Previous and current functional limitations:  (See Goal Flow Sheet for this information)    Short term and Long term goals: (See Goal Flow Sheet for this information)     Communication ability:  Patient appears to be able to clearly communicate and understand verbal and written communication and follow directions correctly.  Treatment Explanation - The following has been discussed with the patient:   RX ordered/plan of care  Anticipated outcomes  Possible risks and side effects  This patient would benefit from PT intervention to resume normal activities.   Rehab potential is good.    Frequency:  1 X week, once daily  Duration:  for 4 weeks  Discharge Plan:  Achieve all LTG.  Independent in home treatment program.  Reach maximal therapeutic benefit.    Please refer to the daily flowsheet for treatment today, total treatment time and time spent performing 1:1 timed codes.

## 2018-12-27 ENCOUNTER — THERAPY VISIT (OUTPATIENT)
Dept: PHYSICAL THERAPY | Facility: CLINIC | Age: 18
End: 2018-12-27
Payer: COMMERCIAL

## 2018-12-27 DIAGNOSIS — G54.0 THORACIC OUTLET SYNDROME: ICD-10-CM

## 2018-12-27 PROCEDURE — 97112 NEUROMUSCULAR REEDUCATION: CPT | Mod: GP | Performed by: PHYSICAL THERAPIST

## 2018-12-27 PROCEDURE — 97110 THERAPEUTIC EXERCISES: CPT | Mod: GP | Performed by: PHYSICAL THERAPIST

## 2018-12-27 PROCEDURE — 97140 MANUAL THERAPY 1/> REGIONS: CPT | Mod: GP | Performed by: PHYSICAL THERAPIST

## 2019-01-02 ENCOUNTER — OFFICE VISIT (OUTPATIENT)
Dept: FAMILY MEDICINE | Facility: CLINIC | Age: 19
End: 2019-01-02
Payer: COMMERCIAL

## 2019-01-02 ENCOUNTER — THERAPY VISIT (OUTPATIENT)
Dept: PHYSICAL THERAPY | Facility: CLINIC | Age: 19
End: 2019-01-02
Payer: COMMERCIAL

## 2019-01-02 VITALS
SYSTOLIC BLOOD PRESSURE: 136 MMHG | WEIGHT: 248 LBS | DIASTOLIC BLOOD PRESSURE: 86 MMHG | BODY MASS INDEX: 31.83 KG/M2 | HEART RATE: 84 BPM

## 2019-01-02 DIAGNOSIS — G54.0 THORACIC OUTLET SYNDROME: ICD-10-CM

## 2019-01-02 DIAGNOSIS — F07.81 CONCUSSION SYNDROME: Primary | ICD-10-CM

## 2019-01-02 PROCEDURE — 97110 THERAPEUTIC EXERCISES: CPT | Mod: GP | Performed by: PHYSICAL THERAPIST

## 2019-01-02 PROCEDURE — 97140 MANUAL THERAPY 1/> REGIONS: CPT | Mod: GP | Performed by: PHYSICAL THERAPIST

## 2019-01-02 PROCEDURE — 97530 THERAPEUTIC ACTIVITIES: CPT | Mod: GP | Performed by: PHYSICAL THERAPIST

## 2019-01-02 NOTE — PROGRESS NOTES
Strength (lb) R L   Attempt #1 72 61   Attempt #2 68 53   Attempt #3 70 52   Average 70 55.3

## 2019-01-02 NOTE — PROGRESS NOTES
SUBJECTIVE:  Shante Lara is a 18 year old female who is seen } for  evaluation of a possible concussion that occurred 3 days ago   Mechanism of injury: pt slipped in parking lot, hit head.  Did not feel initially any concussion sx, had some neck discomfort but now that's resolved.  But 48 hours later, did note some frontal HA and some nausea.  No crescendoing HA, no vomitting.  Tolerating food, sleeping through night.  HA is mild.  Denies previous concussions or previous HA history  Immediate Symptoms: none  Symptoms at this visit:   Headache: 2   Nausea: 1   Balance Problems: 0   Dizziness: 0   Fatigue: 0   Sensitivity to Light :0   Sensitivity to noise: 0   Irritability: 0   Feeling Mentally Foggy: 1   Difficulty Concentratin   Sleep: No Issues    Past pertinent history: Migraines: no     Depression: no     Anxiety: no     Learning disability: no     ADHD: no      Patient's past medical, surgical, social and family histories reviewed:  On today's intake form.      REVIEW OF SYSTEMS:  CONSTITUTIONAL:NEGATIVE for fever, chills, change in weight  INTEGUMENTARY/SKIN: NEGATIVE for worrisome rashes, moles or lesions  MUSCULOSKELETAL:See HPI above  NEURO: NEGATIVE for weakness, dizziness or paresthesias      There were no vitals taken for this visit.        EXAM:  GENERAL APPEARANCE: healthy, alert and no distress   SKIN: no suspicious lesions or rashes  NEURO: Normal strength and tone, mentation intact and speech normal  PSYCH:  mentation appears normal and affect normal/bright    HEENT:  Fundoscopic exam clear optic disc margin, normal cuptodisc ratio  Tympanic Membranes:Pearly  External Ear Canal:Normal  Oropharynx:Atraumatic    NECK:  supple, non-tender, FULL ROM    Neurologic:  Cranial Nerves 2-12:  intact  KESHAWN:Yes  EOMI:Yes  Nystagmus: No  Coordination:  Finger to Nose: Able        Rapid Alternating Movements: Able    Painful Eye movements: No  Convergence Testing: Normal (</= 6 cm)  Visual Field  Testing: normal  Neuro vestibular testing: Head Still eyes move side to side: no nystagmus, no headache, no dizziness and no nausea  Head still eyes move up and down: no nystagmus, no headache, no dizziness and no nausea  Eyes fixed head moves side to side: no nystagmus, no headache, no dizziness and no nausea  Eyes fixed, head moves up and down: no nystagmus, no headache, no dizziness and no nausea       GAIT: Walk in hallway at normal speed: Able            Cognitive:  4 Object Recall at 5 minutes:4/4  Reverse days, months or numbers: 12/12        Strength: neck nontender, supple  Shoulder shrug (C5):5/5  Deltoid (C5): 5/5  Bicep (C6):5/5  Wrist Extension (C6): 5/5  Tricep (C7):5/5  Wrist Flexion (C7): 5/5  Finger Flexion (C8/T1):5/5      ASSESSMENT/PLAN  Concussion sx.  Pt will notify ATC if worsening ha/nausea so that CT scan head could be ordered if necessary.  I do not see an indication for it at this time.  She will enter the [facility] concussion protocol and proceed in stepwise fashion.  Light aerobic exercise only at this time.  Pt not in school for next two weeks.  Will limit screen time.

## 2019-01-02 NOTE — LETTER
2019      RE: Shante Lara  [address] [place] [address]       SUBJECTIVE:  Shante Lara is a 18 year old female who is seen } for  evaluation of a possible concussion that occurred 3 days ago   Mechanism of injury: pt slipped in parking lot, hit head.  Did not feel initially any concussion sx, had some neck discomfort but now that's resolved.  But 48 hours later, did note some frontal HA and some nausea.  No crescendoing HA, no vomitting.  Tolerating food, sleeping through night.  HA is mild.  Denies previous concussions or previous HA history  Immediate Symptoms: none  Symptoms at this visit:   Headache: 2   Nausea: 1   Balance Problems: 0   Dizziness: 0   Fatigue: 0   Sensitivity to Light :0   Sensitivity to noise: 0   Irritability: 0   Feeling Mentally Foggy: 1   Difficulty Concentratin   Sleep: No Issues    Past pertinent history: Migraines: no     Depression: no     Anxiety: no     Learning disability: no     ADHD: no      Patient's past medical, surgical, social and family histories reviewed:  On today's intake form.      REVIEW OF SYSTEMS:  CONSTITUTIONAL:NEGATIVE for fever, chills, change in weight  INTEGUMENTARY/SKIN: NEGATIVE for worrisome rashes, moles or lesions  MUSCULOSKELETAL:See HPI above  NEURO: NEGATIVE for weakness, dizziness or paresthesias      There were no vitals taken for this visit.        EXAM:  GENERAL APPEARANCE: healthy, alert and no distress   SKIN: no suspicious lesions or rashes  NEURO: Normal strength and tone, mentation intact and speech normal  PSYCH:  mentation appears normal and affect normal/bright    HEENT:  Fundoscopic exam clear optic disc margin, normal cuptodisc ratio  Tympanic Membranes:Pearly  External Ear Canal:Normal  Oropharynx:Atraumatic    NECK:  supple, non-tender, FULL ROM    Neurologic:  Cranial Nerves 2-12:  intact  KESHAWN:Yes  EOMI:Yes  Nystagmus: No  Coordination:  Finger to Nose: Able        Rapid Alternating  Movements: Able    Painful Eye movements: No  Convergence Testing: Normal (</= 6 cm)  Visual Field Testing: normal  Neuro vestibular testing: Head Still eyes move side to side: no nystagmus, no headache, no dizziness and no nausea  Head still eyes move up and down: no nystagmus, no headache, no dizziness and no nausea  Eyes fixed head moves side to side: no nystagmus, no headache, no dizziness and no nausea  Eyes fixed, head moves up and down: no nystagmus, no headache, no dizziness and no nausea       GAIT: Walk in hallway at normal speed: Able            Cognitive:  4 Object Recall at 5 minutes:4/4  Reverse days, months or numbers: 12/12        Strength: neck nontender, supple  Shoulder shrug (C5):5/5  Deltoid (C5): 5/5  Bicep (C6):5/5  Wrist Extension (C6): 5/5  Tricep (C7):5/5  Wrist Flexion (C7): 5/5  Finger Flexion (C8/T1):5/5      ASSESSMENT/PLAN  Concussion sx.  Pt will notify ATC if worsening ha/nausea so that CT scan head could be ordered if necessary.  I do not see an indication for it at this time.  She will enter the [facility] concussion protocol and proceed in stepwise fashion.  Light aerobic exercise only at this time.  Pt not in school for next two weeks.  Will limit screen time.      Cristiano Garcia MD

## 2019-01-09 ENCOUNTER — THERAPY VISIT (OUTPATIENT)
Dept: PHYSICAL THERAPY | Facility: CLINIC | Age: 19
End: 2019-01-09
Payer: COMMERCIAL

## 2019-01-09 DIAGNOSIS — G54.0 THORACIC OUTLET SYNDROME: ICD-10-CM

## 2019-01-09 PROCEDURE — 97530 THERAPEUTIC ACTIVITIES: CPT | Mod: GP | Performed by: PHYSICAL THERAPIST

## 2019-01-09 PROCEDURE — 97110 THERAPEUTIC EXERCISES: CPT | Mod: GP | Performed by: PHYSICAL THERAPIST

## 2019-01-09 PROCEDURE — 97112 NEUROMUSCULAR REEDUCATION: CPT | Mod: GP | Performed by: PHYSICAL THERAPIST

## 2019-01-16 ENCOUNTER — THERAPY VISIT (OUTPATIENT)
Dept: PHYSICAL THERAPY | Facility: CLINIC | Age: 19
End: 2019-01-16
Payer: COMMERCIAL

## 2019-01-16 DIAGNOSIS — G54.0 THORACIC OUTLET SYNDROME: ICD-10-CM

## 2019-01-16 PROCEDURE — 97530 THERAPEUTIC ACTIVITIES: CPT | Mod: GP | Performed by: PHYSICAL THERAPIST

## 2019-01-16 PROCEDURE — 97140 MANUAL THERAPY 1/> REGIONS: CPT | Mod: GP | Performed by: PHYSICAL THERAPIST

## 2019-01-16 PROCEDURE — 97110 THERAPEUTIC EXERCISES: CPT | Mod: GP | Performed by: PHYSICAL THERAPIST

## 2019-01-21 ENCOUNTER — THERAPY VISIT (OUTPATIENT)
Dept: PHYSICAL THERAPY | Facility: CLINIC | Age: 19
End: 2019-01-21
Payer: COMMERCIAL

## 2019-01-21 DIAGNOSIS — G54.0 THORACIC OUTLET SYNDROME: ICD-10-CM

## 2019-01-21 PROCEDURE — 97112 NEUROMUSCULAR REEDUCATION: CPT | Mod: GP | Performed by: PHYSICAL THERAPIST

## 2019-01-21 PROCEDURE — 97140 MANUAL THERAPY 1/> REGIONS: CPT | Mod: GP | Performed by: PHYSICAL THERAPIST

## 2019-01-21 PROCEDURE — 97110 THERAPEUTIC EXERCISES: CPT | Mod: GP | Performed by: PHYSICAL THERAPIST

## 2019-01-21 NOTE — PROGRESS NOTES
PROGRESS  REPORT    Progress reporting period is from Dec 19, 2018 to Jan 21, 2019.       SUBJECTIVE  Subjective changes noted by patient: Was able to row for 5 minutes with pain. Has generally felt looser, but was sore for a couple of days afterwards. Overall she has improved quite significantly as she no longer has pain with running or sitting.     Current Pain level: 0/10.     Initial Pain level: 10/10.   Changes in function:  Yes (See Goal flowsheet attached for changes in current functional level)  Adverse reaction to treatment or activity: None    OBJECTIVE  Changes noted in objective findings:  Yes, ROM improved. Improved tissue quality/response with Graston. Improved upper extremity closed chain stability.     Decreased tissue restriction/reactivity in the left pectoral region with Graston.     Patient has a slight decrease in cervical left rotation that improved with STM to the upper trap on the left.  No pain with any cervical ROM.     Thoracic rotation is bilaterally symmetrical.   Improved mobility of T1-T3.          ASSESSMENT/PLAN  Updated problem list and treatment plan:   Referring Diagnosis: Thoracic Outlet Syndrome  Pain -  hot/cold therapy, manual therapy, splint/taping/bracing/orthotics, self management, education and home program  Decreased ROM/flexibility - manual therapy, therapeutic exercise, therapeutic activity and home program  Decreased joint mobility - manual therapy, therapeutic exercise, therapeutic activity and home program  Decreased function - therapeutic activities and home program  Impaired posture - neuro re-education, therapeutic activities and home program    STG/LTGs have been met or progress has been made towards goals:  Yes (See Goal flow sheet completed today.)  Assessment of Progress: The patient's condition is improving.  Patient is meeting short term goals and is progressing towards long term goals.  Self Management Plans:  Patient has been instructed in a home treatment  program.  Patient  has been instructed in self management of symptoms.  I have re-evaluated this patient and find that the nature, scope, duration and intensity of the therapy is appropriate for the medical condition of the patient.  Shante continues to require the following intervention to meet STG and LTG's:  PT    Recommendations:  Patient has improved since starting therapy with currently incomplete resolution of symptoms.   She is returning to school and does not have plans to return to clinic while at school.     Recommend that she follow-up with the [facility] or [facility] locations as needed based on symptoms, back in clinic here if needed/appropriate/convenient.     Please refer to the daily flowsheet for treatment today, total treatment time and time spent performing 1:1 timed codes.

## 2019-01-30 ENCOUNTER — OFFICE VISIT (OUTPATIENT)
Dept: ORTHOPEDICS | Facility: CLINIC | Age: 19
End: 2019-01-30
Payer: COMMERCIAL

## 2019-01-30 VITALS
HEIGHT: 72 IN | SYSTOLIC BLOOD PRESSURE: 124 MMHG | HEART RATE: 79 BPM | WEIGHT: 239 LBS | DIASTOLIC BLOOD PRESSURE: 70 MMHG | BODY MASS INDEX: 32.37 KG/M2

## 2019-01-30 DIAGNOSIS — G54.0 THORACIC OUTLET SYNDROME: Primary | ICD-10-CM

## 2019-01-30 DIAGNOSIS — R07.89 LEFT-SIDED CHEST WALL PAIN: ICD-10-CM

## 2019-01-30 ASSESSMENT — MIFFLIN-ST. JEOR: SCORE: 2013.73

## 2019-01-30 NOTE — LETTER
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  Since our last visit she has been working in physical therapy and has had improvement in her pain.  She has just been working on stretching and mobility but no strengthening yet.  She is able to run without pain. Has not been rowing. If she does get discomfort, she localizes over the left anterior chest wall. Numbness and tingling have resolved.           Medications:     Current Outpatient Medications   Medication Sig     norgestimate-ethinyl estradiol (ORTHO-CYCLEN/SPRINTEC) 0.25-35 MG-MCG tablet Take 1 tablet by mouth daily     venlafaxine (EFFEXOR XR) 37.5 MG 24 hr capsule Take 1 capsule (37.5 mg) by mouth daily     venlafaxine (EFFEXOR XR) 75 MG 24 hr capsule Take 1 capsule (75 mg) by mouth daily     No current facility-administered medications for this visit.              Allergies:      Allergies   Allergen Reactions     Sulfa Drugs Difficulty breathing            Review of Systems:   A comprehensive 10 point review of systems (constitutional, ENT, cardiac, peripheral vascular, respiratory, GI, , Musculoskeletal, skin, Neurological) was performed and found to be negative except as described in this note.           Physical Exam:   COMPLETE EXAMINATION:   VITAL SIGNS: There were no vitals taken for this visit.  GEN: Alert, well-nourished, and in no distress.   HEENT:   Head: Normocephalic and atraumatic.   NEURO: Alert and oriented to person, place, and time. Gait normal.   SKIN: No rash, warmth or erythema  PSYCH: Mood, memory, affect and judgment normal.   MSK: Left anterior chest wall: Slight tenderness to  palpation along intercostal muscles no tenderness along sternum or clavicle.  Left shoulder still anteriorly rotated with mild scapular dyskinesis; cuff strength intact, Deneen's and Adson's negative; with Amaya's test, I do lose her pulse with arm overhead but her radial pulse can be hard to feel at baseline. Once palpable, it is strong and regular. Distal neurovascular exam otherwise normal.         Imagin/3 Rib MRI: IMPRESSION: Limited evaluation of the left upper ribs with no definite  findings to suggest stress changes in the visualized left upper  anterior ribs. No surrounding soft tissue muscular edema or  abnormalities in the cartilage of the ribs.        Assessment:     Thoracic outlet syndrome        Plan:     1.  Continue thoracic mobility and stretching exercises.  Add scapular strengthening exercises.  2.  May start gradually returning to rowing.  If pain or neurovascular symptoms persist consider vascular workup. Slipping rib syndrome or pain amplification may be playing a role as well.    DRAKE Carmona was present for the entire visit.      Aurora Ca MD

## 2019-01-30 NOTE — PROGRESS NOTES
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  Since our last visit she has been working in physical therapy and has had improvement in her pain.  She has just been working on stretching and mobility but no strengthening yet.  She is able to run without pain. Has not been rowing. If she does get discomfort, she localizes over the left anterior chest wall. Numbness and tingling have resolved.           Medications:     Current Outpatient Medications   Medication Sig     norgestimate-ethinyl estradiol (ORTHO-CYCLEN/SPRINTEC) 0.25-35 MG-MCG tablet Take 1 tablet by mouth daily     venlafaxine (EFFEXOR XR) 37.5 MG 24 hr capsule Take 1 capsule (37.5 mg) by mouth daily     venlafaxine (EFFEXOR XR) 75 MG 24 hr capsule Take 1 capsule (75 mg) by mouth daily     No current facility-administered medications for this visit.              Allergies:      Allergies   Allergen Reactions     Sulfa Drugs Difficulty breathing            Review of Systems:   A comprehensive 10 point review of systems (constitutional, ENT, cardiac, peripheral vascular, respiratory, GI, , Musculoskeletal, skin, Neurological) was performed and found to be negative except as described in this note.           Physical Exam:   COMPLETE EXAMINATION:   VITAL SIGNS: There were no vitals taken for this visit.  GEN: Alert, well-nourished, and in no distress.   HEENT:   Head: Normocephalic and atraumatic.   NEURO: Alert and oriented to person, place, and time. Gait normal.   SKIN: No rash, warmth or erythema  PSYCH: Mood, memory, affect and judgment normal.   MSK: Left anterior chest wall: Slight tenderness to palpation along intercostal muscles no tenderness along sternum or clavicle.  Left shoulder still  anteriorly rotated with mild scapular dyskinesis; cuff strength intact, Deneen's and Adson's negative; with Amaya's test, I do lose her pulse with arm overhead but her radial pulse can be hard to feel at baseline. Once palpable, it is strong and regular. Distal neurovascular exam otherwise normal.         Imagin/3 Rib MRI: IMPRESSION: Limited evaluation of the left upper ribs with no definite  findings to suggest stress changes in the visualized left upper  anterior ribs. No surrounding soft tissue muscular edema or  abnormalities in the cartilage of the ribs.        Assessment:     Thoracic outlet syndrome        Plan:     1.  Continue thoracic mobility and stretching exercises.  Add scapular strengthening exercises.  2.  May start gradually returning to rowing.  If pain or neurovascular symptoms persist consider vascular workup. Slipping rib syndrome or pain amplification may be playing a role as well.    DRAKE Carmona was present for the entire visit.

## 2019-03-19 PROBLEM — G54.0 THORACIC OUTLET SYNDROME: Status: RESOLVED | Noted: 2018-12-19 | Resolved: 2019-03-19

## 2019-03-19 NOTE — PROGRESS NOTES
Update as of March 19, 2019: Patient has not returned to clinic. This progress note shall serve as a discharge note.

## 2019-04-10 ENCOUNTER — THERAPY VISIT (OUTPATIENT)
Dept: PHYSICAL THERAPY | Facility: CLINIC | Age: 19
End: 2019-04-10
Payer: COMMERCIAL

## 2019-04-10 DIAGNOSIS — M25.512 ACUTE PAIN OF LEFT SHOULDER: ICD-10-CM

## 2019-04-10 PROCEDURE — 97530 THERAPEUTIC ACTIVITIES: CPT | Mod: GP | Performed by: PHYSICAL THERAPIST

## 2019-04-10 PROCEDURE — 97112 NEUROMUSCULAR REEDUCATION: CPT | Mod: GP | Performed by: PHYSICAL THERAPIST

## 2019-04-10 PROCEDURE — 97110 THERAPEUTIC EXERCISES: CPT | Mod: GP | Performed by: PHYSICAL THERAPIST

## 2019-04-10 NOTE — PROGRESS NOTES
Subjective:  HPI                    Objective:  System    Physical Exam    General     ROS    Assessment/Plan:    PROGRESS  REPORT    Progress reporting period is from 1/30/19 to 4/10/19 .       SUBJECTIVE  Subjective changes noted by patient:  Patient was rowing less than 30 minutes, no symptoms. Jumped to 90 minutes of rowing and felt onset of symptoms in the anterior shoulder. Patient describes sharp pain. Wakes up at night when sleeping on L side with numbness. Patient explains pain with OH activities in anterior shoulder. Patient is currently performing 30 minutes of rowing on ergometer. Pain starts within a couple minutes with no progression of severity, but subsides within a few minutes of rest. Patient is performing stretching, but no strengthening. Patient uses heat prior to rowing and that helps decrease the pain severity.  Current pain level is 0/10 at rest, with activity 7/10.     Previous pain level was 10/10.   Changes in function:  Yes (See Goal flowsheet attached for changes in current functional level)  Adverse reaction to treatment or activity: None    OBJECTIVE  Changes noted in objective findings:  Yes, full AROM for flexion, abduction, ER, and IR. Pain in L anterior shoulder with flexion and abduction near end range.    Strength: 4/5 ER at neutral, prone extension, prone horizontal abduction; 5/5 flexion, abduction, IR - pain with all motions    Scapula: mild bilateral winging in static positioning; equal upward rotation with flexion/abduction; no dynamic winging    TTP: LH biceps, supraspinatus tendon    Special tests: (+) Yergason's, Murdock's, Flores-Braulio        ASSESSMENT/PLAN  Updated problem list and treatment plan: Diagnosis 1:  L shoulder pain consistent biceps tendinits and thoracic outlet syndrome  Pain -  hot/cold therapy, US, electric stimulation, manual therapy, self management, education and home program  Decreased strength - therapeutic exercise, therapeutic activities and home  program  Decreased proprioception - neuro re-education, therapeutic activities and home program  STG/LTGs have been met or progress has been made towards goals:  Yes (See Goal flow sheet completed today.)  Assessment of Progress: The patient's condition has potential to improve.  The patient's condition has exacerbated.  Self Management Plans:  Patient has been instructed in a home treatment program.  I have re-evaluated this patient and find that the nature, scope, duration and intensity of the therapy is appropriate for the medical condition of the patient.  Shante continues to require the following intervention to meet STG and LTG's:  PT    Recommendations:  This patient would benefit from continued therapy.     Frequency:  1 X week, once daily  Duration:  for 6 weeks        Please refer to the daily flowsheet for treatment today, total treatment time and time spent performing 1:1 timed codes.

## 2019-04-17 ENCOUNTER — THERAPY VISIT (OUTPATIENT)
Dept: PHYSICAL THERAPY | Facility: CLINIC | Age: 19
End: 2019-04-17
Payer: COMMERCIAL

## 2019-04-17 DIAGNOSIS — M25.512 ACUTE PAIN OF LEFT SHOULDER: ICD-10-CM

## 2019-04-17 PROCEDURE — 97110 THERAPEUTIC EXERCISES: CPT | Mod: GP | Performed by: PHYSICAL THERAPIST

## 2019-04-17 PROCEDURE — 97112 NEUROMUSCULAR REEDUCATION: CPT | Mod: GP | Performed by: PHYSICAL THERAPIST

## 2019-04-19 ENCOUNTER — HEALTH MAINTENANCE LETTER (OUTPATIENT)
Age: 19
End: 2019-04-19

## 2019-05-03 ENCOUNTER — OFFICE VISIT (OUTPATIENT)
Dept: FAMILY MEDICINE | Facility: CLINIC | Age: 19
End: 2019-05-03

## 2019-05-03 VITALS
DIASTOLIC BLOOD PRESSURE: 72 MMHG | BODY MASS INDEX: 29 KG/M2 | SYSTOLIC BLOOD PRESSURE: 110 MMHG | TEMPERATURE: 98 F | OXYGEN SATURATION: 99 % | HEART RATE: 77 BPM | WEIGHT: 232 LBS

## 2019-05-03 DIAGNOSIS — T63.301A SPIDER BITE WOUND, ACCIDENTAL OR UNINTENTIONAL, INITIAL ENCOUNTER: Primary | ICD-10-CM

## 2019-05-03 DIAGNOSIS — L72.0 EPIDERMAL CYST OF FACE: ICD-10-CM

## 2019-05-03 PROCEDURE — 99213 OFFICE O/P EST LOW 20 MIN: CPT | Performed by: PHYSICIAN ASSISTANT

## 2019-05-03 RX ORDER — CEPHALEXIN 500 MG/1
500 CAPSULE ORAL 2 TIMES DAILY
Qty: 14 CAPSULE | Refills: 0 | Status: SHIPPED | OUTPATIENT
Start: 2019-05-03 | End: 2019-05-22

## 2019-05-03 NOTE — PATIENT INSTRUCTIONS
Most consistent with sebaceous cyst/acne, but given sudden onset, would like to cover for potential spider bite.  Recommend:  Apply heat to area 3-4 times daily 10-15 minutes.  Apply bacitracin/neosporin twice daily.   Take Kelfex antibiotic 1 pill twice daily with food. Warned of side effects, and stop and contact us if noted.  Let me know on Monday if not significantly better, or contact us sooner or be reevaluated if worsening. Fever, sweats, chills, pain in eyes, sharp pain in cheeks, spreading redness.   Consider allergy medication as well to help with reaction.

## 2019-05-03 NOTE — PROGRESS NOTES
CC: Eye issue    History:  Shante woke up this morning with swollen lesion just below right eye. Going to bed the night before had noticed just a small red spot. No discharge. Painful to the touch, or movement of facial muscles. No fever, sweats, chills. No eye symptoms- vision normal, no drainage. No sinus symptoms, flu-like symptoms. Has not tried any treatments yet.     PMH, MEDICATIONS, ALLERGIES, SOCIAL AND FAMILY HISTORY in [facility] and reviewed by me personally.      ROS negative other than the symptoms noted above in the HPI.        Examination   /72 (BP Location: Right arm, Patient Position: Sitting, Cuff Size: Adult Large)   Pulse 77   Temp 98  F (36.7  C) (Oral)   Wt 105.2 kg (232 lb)   LMP 04/22/2019   SpO2 99%   BMI 29.00 kg/m         Constitutional: Sitting comfortably, in no acute distress. Vital signs noted  Eyes: pupils equal round reactive to light and accomodation, extra ocular movements intact  Neck:  no adenopathy, trachea midline and normal to palpation  SKIN: No jaundice/pallor. Cystic lesion 1 cm in diameter on right upper cheek. Mild scaling. Tender to palpation. No drainage.   Psychiatric: mentation appears normal and affect normal/bright      A/P    ICD-10-CM    1. Spider bite wound, accidental or unintentional, initial encounter T63.301A cephALEXin (KEFLEX) 500 MG capsule   2. Epidermal cyst of face L72.0        DISCUSSION:  Most consistent with sebaceous cyst/cystic acne, but given sudden onset, would like to cover for potential spider/insect bite.  Recommend:  Apply heat to area 3-4 times daily 10-15 minutes.  Apply bacitracin/neosporin twice daily.   Considered Bactrim, but pt has sulfa allergy. Recommended take Kelfex antibiotic 1 pill twice daily with food. Warned of side effects, and stop and contact us if noted.  Let me know on Monday if not significantly better, or contact us sooner or be reevaluated if worsening. Fever, sweats, chills, pain in eyes, sharp pain in cheeks,  spreading redness.   Consider allergy medication as well to help with reaction.     follow up visit: As needed    Yadira Andrews PA-C  [facility]

## 2019-05-03 NOTE — NURSING NOTE
Shante is here for swollen eye    Pre-visit Screening:  Immunizations:  up to date  Colonoscopy:  NA  Mammogram: NA  Asthma Action Test/Plan:  NA  PHQ9:  NA  GAD7:  NA  Questioned patient about current smoking habits Pt. has never smoked.  Ok to leave detailed message on voice mail for today's visit only Yes, phone # [phone]

## 2019-05-22 ENCOUNTER — OFFICE VISIT (OUTPATIENT)
Dept: FAMILY MEDICINE | Facility: CLINIC | Age: 19
End: 2019-05-22

## 2019-05-22 VITALS
HEIGHT: 72 IN | WEIGHT: 231 LBS | SYSTOLIC BLOOD PRESSURE: 108 MMHG | HEART RATE: 69 BPM | OXYGEN SATURATION: 99 % | DIASTOLIC BLOOD PRESSURE: 68 MMHG | BODY MASS INDEX: 31.29 KG/M2 | TEMPERATURE: 98.2 F

## 2019-05-22 DIAGNOSIS — R42 DIZZINESS: ICD-10-CM

## 2019-05-22 DIAGNOSIS — Z30.41 ENCOUNTER FOR SURVEILLANCE OF CONTRACEPTIVE PILLS: ICD-10-CM

## 2019-05-22 DIAGNOSIS — T75.3XXA MOTION SICKNESS, INITIAL ENCOUNTER: ICD-10-CM

## 2019-05-22 DIAGNOSIS — Z01.419 ENCOUNTER FOR GYNECOLOGICAL EXAMINATION WITHOUT ABNORMAL FINDING: Primary | ICD-10-CM

## 2019-05-22 PROCEDURE — 88142 CYTOPATH C/V THIN LAYER: CPT | Mod: 90 | Performed by: PHYSICIAN ASSISTANT

## 2019-05-22 PROCEDURE — 99395 PREV VISIT EST AGE 18-39: CPT | Performed by: PHYSICIAN ASSISTANT

## 2019-05-22 RX ORDER — NORGESTIMATE AND ETHINYL ESTRADIOL 0.25-0.035
1 KIT ORAL DAILY
Qty: 84 TABLET | Refills: 3 | Status: SHIPPED | OUTPATIENT
Start: 2019-05-22 | End: 2020-05-27

## 2019-05-22 RX ORDER — VENLAFAXINE HYDROCHLORIDE 75 MG/1
75 CAPSULE, EXTENDED RELEASE ORAL DAILY
Qty: 90 CAPSULE | Refills: 3 | Status: SHIPPED | OUTPATIENT
Start: 2019-05-22 | End: 2020-05-27

## 2019-05-22 RX ORDER — SCOLOPAMINE TRANSDERMAL SYSTEM 1 MG/1
PATCH, EXTENDED RELEASE TRANSDERMAL
Qty: 4 PATCH | Refills: 1 | Status: SHIPPED | OUTPATIENT
Start: 2019-05-22 | End: 2020-05-27

## 2019-05-22 RX ORDER — VENLAFAXINE HYDROCHLORIDE 37.5 MG/1
37.5 CAPSULE, EXTENDED RELEASE ORAL DAILY
Qty: 90 CAPSULE | Refills: 3 | Status: SHIPPED | OUTPATIENT
Start: 2019-05-22 | End: 2020-05-27

## 2019-05-22 SDOH — HEALTH STABILITY: MENTAL HEALTH: HOW OFTEN DO YOU HAVE A DRINK CONTAINING ALCOHOL?: 2-4 TIMES A MONTH

## 2019-05-22 SDOH — HEALTH STABILITY: MENTAL HEALTH: HOW MANY STANDARD DRINKS CONTAINING ALCOHOL DO YOU HAVE ON A TYPICAL DAY?: 1 OR 2

## 2019-05-22 ASSESSMENT — MIFFLIN-ST. JEOR: SCORE: 1964.5

## 2019-05-22 ASSESSMENT — ANXIETY QUESTIONNAIRES
5. BEING SO RESTLESS THAT IT IS HARD TO SIT STILL: NOT AT ALL
1. FEELING NERVOUS, ANXIOUS, OR ON EDGE: SEVERAL DAYS
3. WORRYING TOO MUCH ABOUT DIFFERENT THINGS: NOT AT ALL
IF YOU CHECKED OFF ANY PROBLEMS ON THIS QUESTIONNAIRE, HOW DIFFICULT HAVE THESE PROBLEMS MADE IT FOR YOU TO DO YOUR WORK, TAKE CARE OF THINGS AT HOME, OR GET ALONG WITH OTHER PEOPLE: NOT DIFFICULT AT ALL
GAD7 TOTAL SCORE: 2
2. NOT BEING ABLE TO STOP OR CONTROL WORRYING: NOT AT ALL
6. BECOMING EASILY ANNOYED OR IRRITABLE: NOT AT ALL
7. FEELING AFRAID AS IF SOMETHING AWFUL MIGHT HAPPEN: NOT AT ALL

## 2019-05-22 ASSESSMENT — PATIENT HEALTH QUESTIONNAIRE - PHQ9
5. POOR APPETITE OR OVEREATING: SEVERAL DAYS
SUM OF ALL RESPONSES TO PHQ QUESTIONS 1-9: 1

## 2019-05-22 NOTE — PROGRESS NOTES
Chief Complaint:  Physical Exam    SUBJECTIVE:   Shante Lara is a 19 year old female presents for routine health maintenance.    Current concerns: Would like something to help with motion sickness on upcoming [employer] cruise.     Needs refills of OCP.  1. Do you or anyone in your family have a history of blood clots? No  2. Do you have a history of migraine with aura?  No  3. Do you smoke?  No  4. Do you have a history of hypertension?  No    Needs refills of Effexor- Current dose is working well to control dizziness     Menses are regular    Patient's last menstrual period was 05/10/2019.    Was last Pap smear normal: Patient has never had a Pap test  Due for mammogram:  No    Body mass index is 29.26 kg/m .    Present exercise habits:  5 times/week  Present dietary habits:  eats regular meals and follows a balanced nutrition diet    Calcium intake:  3-4 servings daily  Vit D intake: is not taking supplement    Is the patient a smoker? No  If yes, smoking cessation advised and counseling provided.     Cardiovascular risk factors: none    Over the past few weeks, have you felt down or depressed? Little interest or pleasure in doing things? No concerns    If in a relationship are there any Domestic violence concern: No    Last dental appointment:  this year  Last optical appointment:  this year    Was the patient born between [birth year] and has not had Hep C testing?  No, not applicable    I have reviewed the following histories: Past Medical History, Past Surgical History, Social History, Family History, Problem List, Medication List and Allergies    Past Medical History:   Diagnosis Date     Anxiety      Depression      Raynaud's disease /phenomenon      Family History   Problem Relation Age of Onset     Thyroid Disease Mother      Breast Cancer No family hx of      Colon Cancer No family hx of      Diabetes No family hx of      Diabetes Type 1 No family hx of      Hyperlipidemia No family hx of       Hypertension No family hx of      Social History     Socioeconomic History     Marital status: Single     Spouse name: Not on file     Number of children: Not on file     Years of education: Not on file     Highest education level: Not on file   Occupational History     Occupation: Student   Social Needs     Financial resource strain: Not on file     Food insecurity:     Worry: Not on file     Inability: Not on file     Transportation needs:     Medical: Not on file     Non-medical: Not on file   Tobacco Use     Smoking status: Never Smoker     Smokeless tobacco: Never Used   Substance and Sexual Activity     Alcohol use: Yes     Frequency: 2-4 times a month     Drinks per session: 1 or 2     Drug use: No     Sexual activity: Yes     Partners: Male     Birth control/protection: Condom   Lifestyle     Physical activity:     Days per week: Not on file     Minutes per session: Not on file     Stress: Not on file   Relationships     Social connections:     Talks on phone: Not on file     Gets together: Not on file     Attends Baptism service: Not on file     Active member of club or organization: Not on file     Attends meetings of clubs or organizations: Not on file     Relationship status: Not on file     Intimate partner violence:     Fear of current or ex partner: Not on file     Emotionally abused: Not on file     Physically abused: Not on file     Forced sexual activity: Not on file   Other Topics Concern     Not on file   Social History Narrative     Not on file     Past Surgical History:   Procedure Laterality Date     NO HISTORY OF SURGERY         ROS:  E/M: NEGATIVE for ear, nose, mouth and throat problems  R: NEGATIVE for significant/chronic cough or SOB  CV: NEGATIVE for chest pain or palpitations  GI: NEGATIVE for abdominal pain, chronic diarrhea or constipation  :  NEGATIVE for dysuria, hematuria or vaginal discharge. No sexual health concerns.       Current Outpatient Medications   Medication      "norgestimate-ethinyl estradiol (ORTHO-CYCLEN/SPRINTEC) 0.25-35 MG-MCG tablet     scopolamine (TRANSDERM) 1 MG/3DAYS 72 hr patch     venlafaxine (EFFEXOR XR) 37.5 MG 24 hr capsule     venlafaxine (EFFEXOR XR) 75 MG 24 hr capsule     No current facility-administered medications for this visit.        Patient Active Problem List    Diagnosis Date Noted     Acute pain of left shoulder 04/10/2019     Priority: Medium     Dizziness 12/02/2018     Priority: Medium     Controlled with Effexor       Teeth clenching 04/18/2018     Priority: Medium     Health Care Home 11/30/2018     Priority: Low     ACP (advance care planning) 11/30/2018     Priority: Low         OBJECTIVE:  /68 (BP Location: Left arm, Patient Position: Sitting, Cuff Size: Adult Large)   Pulse 69   Temp 98.2  F (36.8  C) (Oral)   Ht 1.892 m (6' 2.5\")   Wt 104.8 kg (231 lb)   LMP 05/10/2019   SpO2 99%   BMI 29.26 kg/m          General: 19 year old female who appears her stated age. Vital signs noted  Head: Normocephalic  Eyes: pupils equal round reactive to light and accomodation, extra ocular movements intact  Ears: external canals and TMs free of abnormalities  Nose: patent, without mucosal abnormalities  Mouth and throat: without erythema or lesions of the mucosa  Neck: supple, without adenopathy or thyromegaly  Lungs: clear to auscultation, no wheezing or crackles  Breasts: skin without rash, no dominant mass, no nipple discharge, or axillary adenopathy  Cv: regular rate and rhythm, normal s1 and s2 without murmur or click  Abd: soft, non-tender, no masses, no hepatomegaly or splenomegaly.   (female): normal female external genitalia, normal urethral meatus, vaginal mucosa, normal cervix/adnexa/uterus without masses or discharge  Ms: normal muscle tone & symmetry  Skin: clear to inspection and with no palpable abnormalities.  Neuro: sensation and motor function grossly intact; cranial nerves without obvious " "abnormalities.      ASSESSMENT/PLAN:    1. Encounter for routine gynecological examination  Shante is doing well today. Will complete pap and contact with results when available. Refilled OCP and Effexor at current dose for 1 year. Also prescribed scopalamine patches to use on cruise. Warned of possible side effects, and instructed on use.   - ThinPrep Pap and HPV (mRNA E6/E7)HPV-REFLEX ([facility])    2. Encounter for surveillance of contraceptive pills  - norgestimate-ethinyl estradiol (ORTHO-CYCLEN/SPRINTEC) 0.25-35 MG-MCG tablet; Take 1 tablet by mouth daily  Dispense: 84 tablet; Refill: 3    3. Dizziness  - venlafaxine (EFFEXOR XR) 37.5 MG 24 hr capsule; Take 1 capsule (37.5 mg) by mouth daily  Dispense: 90 capsule; Refill: 3  - venlafaxine (EFFEXOR XR) 75 MG 24 hr capsule; Take 1 capsule (75 mg) by mouth daily  Dispense: 90 capsule; Refill: 3    4. Motion sickness, initial encounter  - scopolamine (TRANSDERM) 1 MG/3DAYS 72 hr patch; Place 1 patch onto hairless area of skin behind ear. Replace after 72 hours.  Dispense: 4 patch; Refill: 1     reports that she has never smoked. She has never used smokeless tobacco.      Estimated body mass index is 29.26 kg/m  as calculated from the following:    Height as of this encounter: 1.892 m (6' 2.5\").    Weight as of this encounter: 104.8 kg (231 lb).  Weight management plan: Discussed healthy diet and exercise guidelines      Labs pending:        Pap smear  Meds Suggested:      Vitamin D       Calcium  Tests Recommended:      Regular Dental Examinations        Eye exam  Behavior Modifications:       Cardiovascular exercise 3 times per week--enough to get your Target Heart rate  Other recommendations:     BMI noted and discussed      Regular breast exam     Encouraged My Chart    Counseling Resources:  ATP IV Guidelines  Pooled Cohorts Equation Calculator  Breast Cancer Risk Calculator  FRAX Risk Assessment  ICSI Preventive Guidelines  Dietary Guidelines for Americans, " 2010  USDA's MyPlate            Yadira Andrews PA-C  5/22/2019

## 2019-05-22 NOTE — NURSING NOTE
Shante is here for a fasting CPX with PAP.          Pre-visit Screening:  Immunizations:  up to date  Colonoscopy:  NA  Mammogram: NA  Asthma Action Test/Plan:  NA  PHQ9:  Done today  GAD7:  Done today  Questioned patient about current smoking habits Pt. has never smoked.  Ok to leave detailed message on voice mail for today's visit only Yes, phone # [phone]

## 2019-05-23 ASSESSMENT — ANXIETY QUESTIONNAIRES: GAD7 TOTAL SCORE: 2

## 2019-05-25 LAB
CLINICAL HISTORY - QUEST: NORMAL
COMMENT - QUEST: NORMAL
CYTOTECHNOLOGIST - QUEST: NORMAL
DESCRIPTIVE DIAGNOSIS - QUEST: NORMAL
LAST PAP DX - QUEST: NORMAL
LMP - QUEST: NORMAL
PREV BX DX - QUEST: NORMAL
SOURCE: NORMAL
STATEMENT OF ADEQUACY - QUEST: NORMAL

## 2019-06-19 PROBLEM — M25.512 ACUTE PAIN OF LEFT SHOULDER: Status: RESOLVED | Noted: 2019-04-10 | Resolved: 2019-06-19

## 2019-10-02 ENCOUNTER — HEALTH MAINTENANCE LETTER (OUTPATIENT)
Age: 19
End: 2019-10-02

## 2020-05-21 ENCOUNTER — TELEPHONE (OUTPATIENT)
Dept: FAMILY MEDICINE | Facility: CLINIC | Age: 20
End: 2020-05-21

## 2020-05-27 ENCOUNTER — OFFICE VISIT (OUTPATIENT)
Dept: FAMILY MEDICINE | Facility: CLINIC | Age: 20
End: 2020-05-27

## 2020-05-27 VITALS
HEART RATE: 68 BPM | WEIGHT: 251 LBS | RESPIRATION RATE: 20 BRPM | SYSTOLIC BLOOD PRESSURE: 108 MMHG | DIASTOLIC BLOOD PRESSURE: 78 MMHG | HEIGHT: 72 IN | BODY MASS INDEX: 34 KG/M2 | TEMPERATURE: 97.6 F

## 2020-05-27 DIAGNOSIS — R42 DIZZINESS: ICD-10-CM

## 2020-05-27 DIAGNOSIS — Z30.41 ENCOUNTER FOR SURVEILLANCE OF CONTRACEPTIVE PILLS: ICD-10-CM

## 2020-05-27 PROCEDURE — 99213 OFFICE O/P EST LOW 20 MIN: CPT | Performed by: FAMILY MEDICINE

## 2020-05-27 RX ORDER — NORGESTIMATE AND ETHINYL ESTRADIOL 0.25-0.035
1 KIT ORAL DAILY
Qty: 84 TABLET | Refills: 3 | Status: SHIPPED | OUTPATIENT
Start: 2020-05-27 | End: 2021-06-08

## 2020-05-27 RX ORDER — VENLAFAXINE HYDROCHLORIDE 37.5 MG/1
37.5 CAPSULE, EXTENDED RELEASE ORAL DAILY
Qty: 90 CAPSULE | Refills: 3 | Status: SHIPPED | OUTPATIENT
Start: 2020-05-27 | End: 2020-06-19 | Stop reason: DRUGHIGH

## 2020-05-27 RX ORDER — VENLAFAXINE HYDROCHLORIDE 75 MG/1
75 CAPSULE, EXTENDED RELEASE ORAL DAILY
Qty: 90 CAPSULE | Refills: 3 | Status: SHIPPED | OUTPATIENT
Start: 2020-05-27 | End: 2020-06-19 | Stop reason: DRUGHIGH

## 2020-05-27 ASSESSMENT — MIFFLIN-ST. JEOR: SCORE: 2058.16

## 2020-05-27 NOTE — PROGRESS NOTES
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 in finance    Medical, social, surgical, and family histories reviewed.    ROS:  CONSTITUTIONAL: NEGATIVE for fever, chills  EYES: NEGATIVE for vision changes   RESP: NEGATIVE for significant cough or SOB  CV: NEGATIVE for chest pain, palpitations   GI: NEGATIVE for nausea, abdominal pain, heartburn, or change in bowel habits  : NEGATIVE for frequency, dysuria, or hematuria  MUSCULOSKELETAL: NEGATIVE for significant arthralgias or myalgia  NEURO: NEGATIVE for weakness, dizziness or paresthesias or headache    OBJECTIVE:  /78 (BP Location: Right arm, Patient Position: Chair, Cuff Size: Adult Large)   Pulse 68   Temp 97.6  F (36.4  C)   Resp 20   Ht 1.905 m (6' 3\")   Wt 113.9 kg (251 lb)   LMP 05/11/2020   BMI 31.37 kg/m    EXAM:  GENERAL APPEARANCE: healthy, alert and no distress  EYES: EOMI,  PERRL  HENT: ear canals and TM's normal and nose and mouth without ulcers or lesions  RESP: lungs clear to auscultation - no rales, rhonchi or wheezes  CV: regular rates and rhythm, normal S1 S2, no S3 or S4 and no murmur, click or rub -  ABDOMEN:  soft, nontender, no HSM or masses and bowel sounds normal    ASSESSMENT/PLAN:  (Z30.41) Encounter for surveillance of contraceptive pills  Comment: doing well, papa normal last year  Plan: norgestimate-ethinyl estradiol (ORTHO-CYCLEN)         0.25-35 MG-MCG tablet        continue current medications at current doses     (R42) Dizziness  Comment: well controlled  Plan: venlafaxine (EFFEXOR XR) 37.5 MG 24 hr capsule,        venlafaxine (EFFEXOR XR) 75 MG 24 hr " capsule        continue current medications at current doses

## 2020-05-27 NOTE — NURSING NOTE
Shante Lara is here for a medication check and refill.    Questioned patient about current smoking habits.  Pt. has never smoked.  PULSE regular  My Chart: active  CLASSIFICATION OF OVERWEIGHT AND OBESITY BY BMI                        Obesity Class           BMI(kg/m2)  Underweight                                    < 18.5  Normal                                         18.5-24.9  Overweight                                     25.0-29.9  OBESITY                     I                  30.0-34.9                             II                 35.0-39.9  EXTREME OBESITY             III                >40                            Patient's  BMI Body mass index is 31.37 kg/m .  http://hin.nhlbi.nih.gov/menuplanner/menu.cgi  Pre-visit planning  Immunizations - up to date  Colonoscopy -   Mammogram -   Asthma -   PHQ9 -    SYMONE-7 -

## 2020-06-19 ENCOUNTER — OFFICE VISIT (OUTPATIENT)
Dept: FAMILY MEDICINE | Facility: CLINIC | Age: 20
End: 2020-06-19

## 2020-06-19 VITALS
BODY MASS INDEX: 33.86 KG/M2 | OXYGEN SATURATION: 98 % | HEART RATE: 79 BPM | HEIGHT: 72 IN | SYSTOLIC BLOOD PRESSURE: 114 MMHG | TEMPERATURE: 98.3 F | WEIGHT: 250 LBS | DIASTOLIC BLOOD PRESSURE: 74 MMHG

## 2020-06-19 DIAGNOSIS — H81.8X9 PERSISTENT POSTURAL-PERCEPTUAL DIZZINESS: Primary | ICD-10-CM

## 2020-06-19 PROCEDURE — 99213 OFFICE O/P EST LOW 20 MIN: CPT | Performed by: PHYSICIAN ASSISTANT

## 2020-06-19 RX ORDER — VENLAFAXINE HYDROCHLORIDE 150 MG/1
150 TABLET, EXTENDED RELEASE ORAL DAILY
Qty: 90 TABLET | Refills: 3 | Status: SHIPPED | OUTPATIENT
Start: 2020-06-19 | End: 2021-06-08

## 2020-06-19 ASSESSMENT — MIFFLIN-ST. JEOR: SCORE: 2053.62

## 2020-06-19 ASSESSMENT — ANXIETY QUESTIONNAIRES
6. BECOMING EASILY ANNOYED OR IRRITABLE: NOT AT ALL
7. FEELING AFRAID AS IF SOMETHING AWFUL MIGHT HAPPEN: NOT AT ALL
3. WORRYING TOO MUCH ABOUT DIFFERENT THINGS: NOT AT ALL
5. BEING SO RESTLESS THAT IT IS HARD TO SIT STILL: MORE THAN HALF THE DAYS
IF YOU CHECKED OFF ANY PROBLEMS ON THIS QUESTIONNAIRE, HOW DIFFICULT HAVE THESE PROBLEMS MADE IT FOR YOU TO DO YOUR WORK, TAKE CARE OF THINGS AT HOME, OR GET ALONG WITH OTHER PEOPLE: NOT DIFFICULT AT ALL
GAD7 TOTAL SCORE: 2
2. NOT BEING ABLE TO STOP OR CONTROL WORRYING: NOT AT ALL
1. FEELING NERVOUS, ANXIOUS, OR ON EDGE: NOT AT ALL

## 2020-06-19 ASSESSMENT — PATIENT HEALTH QUESTIONNAIRE - PHQ9
5. POOR APPETITE OR OVEREATING: NOT AT ALL
SUM OF ALL RESPONSES TO PHQ QUESTIONS 1-9: 1

## 2020-06-19 NOTE — PROGRESS NOTES
"CC: Medication Check    History:  Shante is here today to discuss her Effexor. She takes this to help with dizziness. Dizziness is \"like I'm watching a video game, feels unsteady\". Overall works well, but feels like she is getting mild break through. She is pursing a career where this could be an issue, so wanting to better control. Was initially on Effexor 150, but was put onto 112 to see if this still control, and she feels like control was better on 150.    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    PMH, MEDICATIONS, ALLERGIES, SOCIAL AND FAMILY HISTORY in [facility] and reviewed by me personally.    ROS negative other than the symptoms noted above in the HPI.      Examination   /74 (BP Location: Right arm, Patient Position: Sitting, Cuff Size: Adult Large)   Pulse 79   Temp 98.3  F (36.8  C) (Oral)   Ht 1.905 m (6' 3\")   Wt 113.4 kg (250 lb)   LMP 06/05/2020   SpO2 98%   BMI 31.25 kg/m       Constitutional: Sitting comfortably, in no acute distress. Vital signs noted  Eyes: pupils equal round reactive to light and accomodation, extra ocular movements intact  Neck:  no adenopathy, trachea midline and normal to palpation  Cardiovascular:  regular rate and rhythm, no murmurs, clicks, or gallops  Respiratory:  normal respiratory rate and rhythm, lungs clear to auscultation  SKIN: No jaundice/pallor/rash.   Psychiatric: mentation appears normal and affect normal/bright    A/P    ICD-10-CM    1. Persistent postural-perceptual dizziness  R42 venlafaxine (EFFEXOR-ER) 150 MG 24 hr tablet       DISCUSSION:  Shante is doing well today. Based on above, I do feel like it is reasonable to do trial back on Effexor  mg. Warned her of side effects, and to contact me if noted. Explained to her that if not tolerating, could consider doing 75 mg BID before going back down on dose. Refilled for 1 year, but asked her to contact me sooner if worsening or concerns. "     follow up visit: As needed    [facility]

## 2020-06-19 NOTE — NURSING NOTE
Shante is here today to discuss changing up her medications.    Pre-visit Screening:  Immunizations:  up to date  Colonoscopy:  NA  Mammogram: NA  Asthma Action Test/Plan:  NA  PHQ9:  Done today  GAD7:  Done today  Questioned patient about current smoking habits Pt. has never smoked.  Ok to leave detailed message on voice mail for today's visit only Yes, phone # [phone]

## 2020-06-20 ASSESSMENT — ANXIETY QUESTIONNAIRES: GAD7 TOTAL SCORE: 2

## 2020-10-26 ENCOUNTER — OFFICE VISIT (OUTPATIENT)
Dept: FAMILY MEDICINE | Facility: CLINIC | Age: 20
End: 2020-10-26

## 2020-10-26 VITALS
DIASTOLIC BLOOD PRESSURE: 80 MMHG | HEIGHT: 72 IN | SYSTOLIC BLOOD PRESSURE: 118 MMHG | WEIGHT: 240 LBS | TEMPERATURE: 98.1 F | OXYGEN SATURATION: 98 % | HEART RATE: 79 BPM | BODY MASS INDEX: 32.51 KG/M2

## 2020-10-26 DIAGNOSIS — Z87.898 HISTORY OF HEADACHE: ICD-10-CM

## 2020-10-26 DIAGNOSIS — H81.8X9 PERSISTENT POSTURAL-PERCEPTUAL DIZZINESS: Primary | ICD-10-CM

## 2020-10-26 PROCEDURE — 99213 OFFICE O/P EST LOW 20 MIN: CPT | Mod: 25 | Performed by: PHYSICIAN ASSISTANT

## 2020-10-26 PROCEDURE — 90471 IMMUNIZATION ADMIN: CPT | Performed by: PHYSICIAN ASSISTANT

## 2020-10-26 PROCEDURE — 90686 IIV4 VACC NO PRSV 0.5 ML IM: CPT | Performed by: PHYSICIAN ASSISTANT

## 2020-10-26 ASSESSMENT — MIFFLIN-ST. JEOR: SCORE: 2008.26

## 2020-10-26 NOTE — NURSING NOTE
Shante is here for [employer] History form.        Pre-visit Screening:  Immunizations:  up to date  Colonoscopy:  NA  Mammogram: NA  Asthma Action Test/Plan:  NA  PHQ9:  NA  GAD7:  NA  Questioned patient about current smoking habits Pt. has never smoked.  Ok to leave detailed message on voice mail for today's visit only Yes, phone # cell

## 2020-10-26 NOTE — PROGRESS NOTES
"CC: Form for work    History:  Shante is here today needing forms filled out for her work place. She has already initiated a medical evaluation through them, but they have some f/u questions.     Most of their questions are related to their PPPD diagnosis for which she takes Effexor. This was originally diagnosed by [facility]. She has records from them.     PPPD: Prior to starting on Effexor in 2015, was having these episodes of vertigo daily and constantly. After starting Effexor, symptoms have resolved. Has tried lower dose than 150 mg, but could tell she feel more stable on higher dose.     Concussion: Had mild concussion 11/22/2013 when hit by a hockey puck. Had facial laceration at this time. Did not have any loss of consciousness. Was already having some dizziness from the PPPD before this injury. There was no notable change in dizziness or new sympoms from this. Chronic symptoms were resolved 2 years later once venlafaxine was initiated. Headache had resolved before dizziness became more severe.     PMH, MEDICATIONS, ALLERGIES, SOCIAL AND FAMILY HISTORY in [facility] and reviewed by me personally.    ROS negative other than the symptoms noted above in the HPI.    Examination   /80   Pulse 79   Temp 98.1  F (36.7  C)   Ht 1.905 m (6' 3\")   Wt 108.9 kg (240 lb)   LMP 10/21/2020   SpO2 98%   BMI 30.00 kg/m       Constitutional: Sitting comfortably, in no acute distress. Vital signs noted  Eyes: pupils equal round reactive to light and accomodation, extra ocular movements intact  Neck:  no adenopathy, trachea midline and normal to palpation, thyroid normal to palpation  Cardiovascular:  regular rate and rhythm, no murmurs, clicks, or gallops  Respiratory:  normal respiratory rate and rhythm, lungs clear to auscultation  NEURO:  speech normal, mental status intact, cranial nerves 2-12 intact, gait, including heel, toe, and tandem walking normal, muscle strength normal, rapid alternating movements normal, " vibratory sensation normal, proprioception normal, reflexes normal and symmetric  SKIN: No jaundice/pallor/rash.   Psychiatric: mentation appears normal and affect normal/bright    A/P    ICD-10-CM    1. Persistent postural-perceptual dizziness  R42     Diagnosed through [facility]. Well Controlled on Effexor.    2. History of headache  Z87.898     Resolved 2014       DISCUSSION:  Spent most of appt discussing history of Shante's PPPD. Fortunately, her symptoms are completely controlled on Effexor, and she does not have have any side effects. Agreed to write letter for Shante's workplace as I feel it is medically appropriate for her to move forward with her training. Reviewed documents from [facility] diagnosis. Will scan these records into her chart. See my letter in patient's chart.     follow up visit: As needed    Yadira Andrews PA-C  [facility]

## 2020-10-26 NOTE — LETTER
[facility]  [address], [address]  [place]  [address]    2020        Shante Lara  : 2000  [address] [place] [address]              To Whom It May Concern:    Shante is a patient at our clinic, who I have been seeing since 2018.    Shante developed intermittent dizziness and instability often triggered by motion 2012. Episodes happened more and more frequently so Shante was seen by several specialists at [facility] 2015, whose records she has and will provide. She was diagnosed with persistent postural-perceptual dizziness (PPPD). She has since been maintained on venlafaxine 150 mg daily. She does not have any side effects from taking venlafaxine. Since being initiated and maintained on this medication, she has been able to partake in rigorous exercise, driving without concern. Given that this condition is fully controlled, I do not feel like this condition would have an impact on her work or training.     Shante also has a history of headaches. She sustained what was thought to be a mild concussion 2013, which caused headaches to worsen for a short period of time, but this resolved back to her baseline headaches as she recovered from the concussion. During her work-up for dizziness at [facility] 2015, they were also made aware of her history of intermittent headaches/migraines, which they thought to be vestibular in nature. By the time of her [facility] appointments 2015, headaches had been improving, but has not experienced headaches since she stabilized on venlafaxine therapy, which does support the diagnosis of vestibular migraines. At this point, she does not experience headaches, or post-concussive symptoms, and I have no concerns that these would interfere with her work, training, or vigorous exercise.     If you have any further questions or problems, please contact our office at [phone].          Yadira  ISMAEL Andrews PA-C

## 2020-10-29 ENCOUNTER — MYC MEDICAL ADVICE (OUTPATIENT)
Dept: FAMILY MEDICINE | Facility: CLINIC | Age: 20
End: 2020-10-29

## 2020-10-29 NOTE — LETTER
[facility]  [address], [address]  [place]  [address]    2020        Shante Lara  : 2000  [address] [place] [address]              To Whom It May Concern:    Shante is a patient at our clinic, who I have been seeing since 2018, and saw in office 10/26/2020.     Shante developed intermittent dizziness and instability often triggered by motion 2012. Episodes happened more and more frequently so Shante was seen by several specialists at [facility] 2015, whose records she has and will provide. She was diagnosed with persistent postural-perceptual dizziness (PPPD). She has since been maintained on venlafaxine 150 mg daily. She does not have any side effects from taking venlafaxine. Since being initiated and maintained on this medication, she has been able to partake in rigorous exercise, driving without concern. Given that this condition is fully controlled, I do not feel like this condition would have an impact on her work or training.     Shante also has a history of headaches. She sustained what was thought to be a mild concussion 2013, which caused headaches to worsen for a short period of time, but this resolved back to her baseline headaches as she recovered from the concussion. During her work-up for dizziness at [facility] 2015, they were also made aware of her history of intermittent headaches/migraines, which they thought to be vestibular in nature. By the time of her [facility] appointments 2015, headaches had been improving, but has not experienced headaches since she stabilized on venlafaxine therapy, which does support the diagnosis of vestibular migraines. At this point, she does not experience headaches, or post-concussive symptoms, and had a normal neurological exam 10/26/2020.  I have no concerns that these would interfere with her work, training, or vigorous exercise.     If you have any further questions  or problems, please contact our office at [phone].          Yadira Andrews PA-C

## 2020-10-29 NOTE — TELEPHONE ENCOUNTER
Routing to Yadira for review, can you print a copy of the letter and sign for the patient to come and ?

## 2020-10-30 ENCOUNTER — MYC MEDICAL ADVICE (OUTPATIENT)
Dept: FAMILY MEDICINE | Facility: CLINIC | Age: 20
End: 2020-10-30

## 2020-11-01 PROBLEM — Z87.898 HISTORY OF HEADACHE: Status: ACTIVE | Noted: 2020-11-01

## 2020-11-01 PROBLEM — H81.8X9 PERSISTENT POSTURAL-PERCEPTUAL DIZZINESS: Status: ACTIVE | Noted: 2018-12-02

## 2020-11-02 ENCOUNTER — MYC MEDICAL ADVICE (OUTPATIENT)
Dept: FAMILY MEDICINE | Facility: CLINIC | Age: 20
End: 2020-11-02

## 2020-11-02 ENCOUNTER — TRANSFERRED RECORDS (OUTPATIENT)
Dept: FAMILY MEDICINE | Facility: CLINIC | Age: 20
End: 2020-11-02

## 2021-01-15 ENCOUNTER — HEALTH MAINTENANCE LETTER (OUTPATIENT)
Age: 21
End: 2021-01-15

## 2021-05-26 DIAGNOSIS — Z30.41 ENCOUNTER FOR SURVEILLANCE OF CONTRACEPTIVE PILLS: ICD-10-CM

## 2021-05-26 RX ORDER — NORGESTIMATE AND ETHINYL ESTRADIOL 0.25-0.035
KIT ORAL
Qty: 84 TABLET | Refills: 0 | COMMUNITY
Start: 2021-05-26

## 2021-05-26 NOTE — TELEPHONE ENCOUNTER
Received incoming refill request for  Pending Prescriptions:                       Disp   Refills    MONO-LINYAH 0.25-35 MG-MCG tablet [Pharma*84 tab*0            Sig: TAKE ONE TABLET BY MOUTH ONE TIME DAILY     Patient last had a refill of this medication on 5/27/2020 and the patient is now due to be seen for an OV.

## 2021-05-26 NOTE — TELEPHONE ENCOUNTER
Denied refill request for venlafaxine. Last filled 6/19/2020. Pt is due for annual office visit.  Will send a [employer]¢â[employer]Â¢ Interactive message to inform pt.

## 2021-06-08 ENCOUNTER — OFFICE VISIT (OUTPATIENT)
Dept: FAMILY MEDICINE | Facility: CLINIC | Age: 21
End: 2021-06-08

## 2021-06-08 VITALS
OXYGEN SATURATION: 98 % | WEIGHT: 287 LBS | HEART RATE: 100 BPM | HEIGHT: 72 IN | SYSTOLIC BLOOD PRESSURE: 116 MMHG | BODY MASS INDEX: 38.87 KG/M2 | DIASTOLIC BLOOD PRESSURE: 76 MMHG | TEMPERATURE: 97.2 F

## 2021-06-08 DIAGNOSIS — H81.8X9 PERSISTENT POSTURAL-PERCEPTUAL DIZZINESS: ICD-10-CM

## 2021-06-08 DIAGNOSIS — Z30.41 ENCOUNTER FOR SURVEILLANCE OF CONTRACEPTIVE PILLS: ICD-10-CM

## 2021-06-08 PROCEDURE — 99213 OFFICE O/P EST LOW 20 MIN: CPT | Performed by: PHYSICIAN ASSISTANT

## 2021-06-08 RX ORDER — NORGESTIMATE AND ETHINYL ESTRADIOL 0.25-0.035
1 KIT ORAL DAILY
Qty: 84 TABLET | Refills: 3 | Status: SHIPPED | OUTPATIENT
Start: 2021-06-08 | End: 2022-05-24

## 2021-06-08 RX ORDER — VENLAFAXINE HYDROCHLORIDE 150 MG/1
150 TABLET, EXTENDED RELEASE ORAL DAILY
Qty: 90 TABLET | Refills: 3 | Status: SHIPPED | OUTPATIENT
Start: 2021-06-08 | End: 2022-05-12

## 2021-06-08 ASSESSMENT — ANXIETY QUESTIONNAIRES
2. NOT BEING ABLE TO STOP OR CONTROL WORRYING: NOT AT ALL
6. BECOMING EASILY ANNOYED OR IRRITABLE: NOT AT ALL
5. BEING SO RESTLESS THAT IT IS HARD TO SIT STILL: NOT AT ALL
1. FEELING NERVOUS, ANXIOUS, OR ON EDGE: SEVERAL DAYS
GAD7 TOTAL SCORE: 1
7. FEELING AFRAID AS IF SOMETHING AWFUL MIGHT HAPPEN: NOT AT ALL
3. WORRYING TOO MUCH ABOUT DIFFERENT THINGS: NOT AT ALL
IF YOU CHECKED OFF ANY PROBLEMS ON THIS QUESTIONNAIRE, HOW DIFFICULT HAVE THESE PROBLEMS MADE IT FOR YOU TO DO YOUR WORK, TAKE CARE OF THINGS AT HOME, OR GET ALONG WITH OTHER PEOPLE: NOT DIFFICULT AT ALL

## 2021-06-08 ASSESSMENT — PATIENT HEALTH QUESTIONNAIRE - PHQ9
SUM OF ALL RESPONSES TO PHQ QUESTIONS 1-9: 1
5. POOR APPETITE OR OVEREATING: NOT AT ALL

## 2021-06-08 ASSESSMENT — MIFFLIN-ST. JEOR: SCORE: 2216.45

## 2021-06-08 NOTE — NURSING NOTE
Shante is here for a med check today for birth control and mental health.        Pre-visit Screening:  Immunizations:  up to date  Colonoscopy:  NA  Mammogram: NA  Asthma Action Test/Plan:  NA  PHQ9:  Done today  GAD7:  Done today  Questioned patient about current smoking habits Pt. has never smoked.  Ok to leave detailed message on voice mail for today's visit only Yes, phone # [phone]

## 2021-06-08 NOTE — PROGRESS NOTES
"CC: Medication Check     History:  Dizziness: Shante has been taking Effexor 150 mg to help with dizziness, steadiness. Started on this medication around age 15. 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 Has recently accepted a job, and this has been going well for the last month.     OCP:  Up to date on pap smear. Doing well on current OCP. No side effects. No concerns for STDs.   1. Do you or anyone in your family have a history of blood clots? No  2. Do you have a history of migraine with aura?  No  3. Do you smoke?  No  4. Do you have a history of hypertension?  No    Weight gain:  Gained 30 lbs since 5/2021 appt. Knows she needs to work on improving diet.     PMH, MEDICATIONS, ALLERGIES, SOCIAL AND FAMILY HISTORY in [facility] and reviewed by me personally.    ROS negative other than the symptoms noted above in the HPI.      Examination   /76 (BP Location: Left arm, Patient Position: Sitting, Cuff Size: Adult Large)   Pulse 100   Temp 97.2  F (36.2  C) (Temporal)   Ht 1.905 m (6' 3\")   Wt 130.2 kg (287 lb)   LMP 05/30/2021   SpO2 98%   BMI 35.87 kg/m       Constitutional: Sitting comfortably, in no acute distress. Vital signs noted  Neck:  no adenopathy, trachea midline and normal to palpation, thyroid normal to palpation  Cardiovascular:  regular rate and rhythm, no murmurs, clicks, or gallops  Respiratory:  normal respiratory rate and rhythm, lungs clear to auscultation  SKIN: No jaundice/pallor/rash.   Psychiatric: mentation appears normal and affect normal/bright      A/P    ICD-10-CM    1. Encounter for surveillance of contraceptive pills  Z30.41 norgestimate-ethinyl estradiol (ORTHO-CYCLEN) 0.25-35 MG-MCG tablet   2. Persistent postural-perceptual dizziness  R42 venlafaxine (EFFEXOR-ER) 150 MG 24 hr tablet       DISCUSSION:  Dizziness: Doing well on " venlafaxine 150 mg daily. Agreed to refill without change for 1 year, but contact me sooner with concerns.     OCP:  Doing well on birth control. No concerns. Will refill for 1 year. Due for pap next year.     Weight gain:  Concerned about weight gain. Recommended healthy diet and exercise, with goal of weight loss. Consider checking thyroid, but pt feels like it's explainable with lifestyle.     follow up visit: 1 year    Yadira Hadley PA-C  [facility]

## 2021-06-09 ASSESSMENT — ANXIETY QUESTIONNAIRES: GAD7 TOTAL SCORE: 1

## 2021-09-04 ENCOUNTER — HEALTH MAINTENANCE LETTER (OUTPATIENT)
Age: 21
End: 2021-09-04

## 2021-09-29 ENCOUNTER — OFFICE VISIT (OUTPATIENT)
Dept: FAMILY MEDICINE | Facility: CLINIC | Age: 21
End: 2021-09-29

## 2021-09-29 VITALS
TEMPERATURE: 97.3 F | HEIGHT: 72 IN | SYSTOLIC BLOOD PRESSURE: 112 MMHG | OXYGEN SATURATION: 96 % | WEIGHT: 286 LBS | BODY MASS INDEX: 38.74 KG/M2 | DIASTOLIC BLOOD PRESSURE: 80 MMHG | HEART RATE: 84 BPM

## 2021-09-29 DIAGNOSIS — R41.89 BRAIN FOG: ICD-10-CM

## 2021-09-29 DIAGNOSIS — G44.209 TENSION HEADACHE: Primary | ICD-10-CM

## 2021-09-29 DIAGNOSIS — M54.2 NECK PAIN: ICD-10-CM

## 2021-09-29 PROCEDURE — 99213 OFFICE O/P EST LOW 20 MIN: CPT | Performed by: PHYSICIAN ASSISTANT

## 2021-09-29 PROCEDURE — G2023 SPECIMEN COLLECT COVID-19: HCPCS | Performed by: PHYSICIAN ASSISTANT

## 2021-09-29 PROCEDURE — 87635 SARS-COV-2 COVID-19 AMP PRB: CPT | Mod: 90 | Performed by: PHYSICIAN ASSISTANT

## 2021-09-29 RX ORDER — CYCLOBENZAPRINE HCL 5 MG
5-10 TABLET ORAL 3 TIMES DAILY PRN
Qty: 30 TABLET | Refills: 0 | Status: SHIPPED | OUTPATIENT
Start: 2021-09-29 | End: 2022-01-04

## 2021-09-29 ASSESSMENT — MIFFLIN-ST. JEOR: SCORE: 2211.92

## 2021-09-29 NOTE — PROGRESS NOTES
"CC: Headache    History:  Shante is here today with headache that started in back of head 5 days ago. Pain is in back of head on the center line or slightly off to the right. Also noticing mental fog, lethargic. Pain is 2/10 with 10 being the worst. Seems to be getting slightly worse with pain, brain fog.  Not really there in the morning, but then feels it later in the day. No fever, sweats, chills, cough, vision changes, nasal congestion, body aches, neck stiffness. Does have some light and noise sensitivity. Has not been tested for Covid-19. No known exposures to Covid-19. Vaccinated. Has been taking ibuprofen, Tylenol, heating, neck stretching, and hasn't changed symptoms .     Work has been going well. Not significant stress, injuries, falls, change in activity. No ongoing dizziness. Has been sleeping well.    PMH, MEDICATIONS, ALLERGIES, SOCIAL AND FAMILY HISTORY in [facility] and reviewed by me personally.    ROS negative other than the symptoms noted above in the HPI.      Examination  /80 (BP Location: Right arm, Patient Position: Sitting, Cuff Size: Adult Large)   Pulse 84   Temp 97.3  F (36.3  C) (Temporal)   Ht 1.905 m (6' 3\")   Wt 129.7 kg (286 lb)   LMP 09/20/2021   SpO2 96%   BMI 35.75 kg/m       Constitutional: Sitting comfortably, in no acute distress. Vital signs noted  Eyes: pupils equal round reactive to light and accomodation, extra ocular movements intact  Ears: external canals and TMs free of abnormalities  Nose: patent, without mucosal abnormalities  Mouth and throat: without erythema or lesions of the mucosa  Neck:  no adenopathy, trachea midline and normal to palpation, thyroid normal to palpation  Cardiovascular:  regular rate and rhythm, no murmurs, clicks, or gallops  Respiratory:  normal respiratory rate and rhythm, lungs clear to auscultation  M/S: ROM of neck intact. Tender to palpation over posterior base of occiput right side.   NEURO:  speech normal, mental status intact, " cranial nerves 2-12 intact, muscle strength normal, sensation to light touch and pinprick normal, proprioception normal, reflexes normal and symmetric  SKIN: No jaundice/pallor/rash.   Psychiatric: mentation appears normal and affect normal/bright        A/P    ICD-10-CM    1. Tension headache  G44.209 cyclobenzaprine (FLEXERIL) 5 MG tablet     SARS CoV2 CoVid 19 Qual  ([facility])   2. Neck pain  M54.2 cyclobenzaprine (FLEXERIL) 5 MG tablet   3. Brain fog  F48.8 SARS CoV2 CoVid 19 Qual  ([facility])       DISCUSSION:  Minimally suspicious, but did collect Covid-19 test from pt to given brain fog. Reassured by low level symptoms. Suspect this is musculoskeletal in nature. Recommended trial of cyclobenzaprine (Flexaril) muscle relaxant that pt can take up to 3 times daily. Warned them of possible side effects, including drowsiness, and no driving if drowsy.  If not effective, may also have her do trial of antihistamine given timing. Ultimately if this doesn't resolve would refer to neurology. Contact me if not resolving, or sooner if worsening. Proceed to ER with any significant worsening.      follow up visit: As needed    Yadira Hadley PA-C  [facility]

## 2021-09-29 NOTE — NURSING NOTE
Chief Complaint   Patient presents with     Headache     headace in the back of her head thta is a dull pain, been going on for 6 days, has tried tylenol and iuprofen but did not have any relief.         Pre-visit Screening:  Immunizations:  up to date  Colonoscopy:  NA  Mammogram: NA  Asthma Action Test/Plan:  NA  PHQ9:  UTD  GAD7:  UTD  Questioned patient about current smoking habits Pt. has never smoked.  Ok to leave detailed message on voice mail for today's visit only Yes, phone # [phone]

## 2021-10-01 LAB — SARS-COV-2 RNA SPEC QL NAA+PROBE: NOT DETECTED

## 2021-11-22 ENCOUNTER — TRANSFERRED RECORDS (OUTPATIENT)
Dept: FAMILY MEDICINE | Facility: CLINIC | Age: 21
End: 2021-11-22

## 2021-12-27 ENCOUNTER — MYC MEDICAL ADVICE (OUTPATIENT)
Dept: FAMILY MEDICINE | Facility: CLINIC | Age: 21
End: 2021-12-27

## 2022-01-04 ENCOUNTER — OFFICE VISIT (OUTPATIENT)
Dept: FAMILY MEDICINE | Facility: CLINIC | Age: 22
End: 2022-01-04

## 2022-01-04 VITALS
DIASTOLIC BLOOD PRESSURE: 82 MMHG | BODY MASS INDEX: 37.87 KG/M2 | TEMPERATURE: 97.6 F | HEART RATE: 68 BPM | WEIGHT: 279.6 LBS | HEIGHT: 72 IN | SYSTOLIC BLOOD PRESSURE: 122 MMHG | OXYGEN SATURATION: 98 %

## 2022-01-04 DIAGNOSIS — H81.8X9 PERSISTENT POSTURAL-PERCEPTUAL DIZZINESS: Primary | ICD-10-CM

## 2022-01-04 DIAGNOSIS — G44.219 EPISODIC TENSION-TYPE HEADACHE, NOT INTRACTABLE: ICD-10-CM

## 2022-01-04 LAB
ERYTHROCYTE [DISTWIDTH] IN BLOOD BY AUTOMATED COUNT: 12.3 %
HCT VFR BLD AUTO: 40.3 % (ref 35–47)
HEMOGLOBIN: 13.7 G/DL (ref 11.7–15.7)
MCH RBC QN AUTO: 28.3 PG (ref 26–33)
MCHC RBC AUTO-ENTMCNC: 34 G/DL (ref 31–36)
MCV RBC AUTO: 83.3 FL (ref 78–100)
PLATELET COUNT - QUEST: 278 10^9/L (ref 150–375)
RBC # BLD AUTO: 4.84 10*12/L (ref 3.8–5.2)
WBC # BLD AUTO: 6.8 10*9/L (ref 4–11)

## 2022-01-04 PROCEDURE — 82607 VITAMIN B-12: CPT | Mod: 90 | Performed by: PHYSICIAN ASSISTANT

## 2022-01-04 PROCEDURE — 85027 COMPLETE CBC AUTOMATED: CPT | Performed by: PHYSICIAN ASSISTANT

## 2022-01-04 PROCEDURE — 99213 OFFICE O/P EST LOW 20 MIN: CPT | Performed by: PHYSICIAN ASSISTANT

## 2022-01-04 PROCEDURE — 36415 COLL VENOUS BLD VENIPUNCTURE: CPT | Performed by: PHYSICIAN ASSISTANT

## 2022-01-04 PROCEDURE — 86038 ANTINUCLEAR ANTIBODIES: CPT | Mod: 90 | Performed by: PHYSICIAN ASSISTANT

## 2022-01-04 PROCEDURE — 82746 ASSAY OF FOLIC ACID SERUM: CPT | Mod: 90 | Performed by: PHYSICIAN ASSISTANT

## 2022-01-04 PROCEDURE — 83550 IRON BINDING TEST: CPT | Mod: 90 | Performed by: PHYSICIAN ASSISTANT

## 2022-01-04 PROCEDURE — 82728 ASSAY OF FERRITIN: CPT | Mod: 90 | Performed by: PHYSICIAN ASSISTANT

## 2022-01-04 PROCEDURE — 83540 ASSAY OF IRON: CPT | Mod: 90 | Performed by: PHYSICIAN ASSISTANT

## 2022-01-04 PROCEDURE — 84443 ASSAY THYROID STIM HORMONE: CPT | Mod: 90 | Performed by: PHYSICIAN ASSISTANT

## 2022-01-04 RX ORDER — VENLAFAXINE HYDROCHLORIDE 37.5 MG/1
37.5 TABLET, EXTENDED RELEASE ORAL DAILY
Qty: 30 TABLET | Refills: 1 | Status: SHIPPED | OUTPATIENT
Start: 2022-01-04 | End: 2022-05-12

## 2022-01-04 ASSESSMENT — MIFFLIN-ST. JEOR: SCORE: 2182.89

## 2022-01-04 NOTE — PROGRESS NOTES
"CC: Recheck    History:  Shante was last 9/2021 with new symptoms of headaches and episodes of being out of body. Had referred her to neurology, which she had 11/22/2021 through [facility]. Reviewed this note with her where main abnormality was increased reflex. They acknowledged this could be normal variant, but recommended MRI, EEG, and labs. Since that visit, Shante has discussed with her parents, and decided she would like to return to [facility] where she has already had significant neurological work-up in 2015 where she was diagnosed with persistent postural perceptual dizziness. Part of her reasoning is she doesn't want to have to repeat testing/MRI, and her symptoms are mild/manageable and stable. She is scheduled at [place] in March. She is wondering in the meantime, if she could do a trial of a slightly higher dose of her Effexor.     PMH, MEDICATIONS, ALLERGIES, SOCIAL AND FAMILY HISTORY in [facility] and reviewed by me personally.    ROS negative other than the symptoms noted above in the HPI.    Examination   /82 (BP Location: Right arm, Patient Position: Sitting, Cuff Size: Adult Large)   Pulse 68   Temp 97.6  F (36.4  C) (Temporal)   Ht 1.905 m (6' 3\")   Wt 126.8 kg (279 lb 9.6 oz)   LMP 12/24/2021   SpO2 98%   BMI 34.95 kg/m     Constitutional: Sitting comfortably, in no acute distress. Vital signs noted  Psychiatric: mentation appears normal and affect normal/bright        A/P    ICD-10-CM    1. Persistent postural-perceptual dizziness  R42 VENOUS COLLECTION     TSH WITH FREE T4 REFLEX ([facility])     BAHMAN Scrn Rflx to Titer and Ptrn ([facility])     VITAMIN B12 ([facility])     FOLATE ([facility])     IRON AND IRON BINDING CAPACITY ([facility])     venlafaxine (EFFEXOR-ER) 37.5 MG 24 hr tablet     Hemogram Platelet ([facility])     FERRITIN ([facility])   2. Episodic tension-type headache, not intractable  G44.219        DISCUSSION:  Reviewed neurology, symptoms with Shante. She did agree to do initial blood work with us, which won't " be exhaustive, but will establish a baseline. Will check TSH, BAHMAN, vitamin B12, CBC, ferritin, Iron studies. These were advised by neurologist. We are unable to test methylmalonic acid through our lab. I will contact her with result.     In the meantime, did agree to do trial of additional Effexor 37.5 mg as well as her current 150 mg. Minimal concern for this causing any worsening of neurological process, and may help based on her based experiences. Monitor for side effects. Contact me if noted. Consider completing imaging ahead of [facility], but reasonable to wait if symptoms remain stable.     follow up visit: As needed.     Yadira Hadley PA-C  [facility]

## 2022-01-04 NOTE — NURSING NOTE
Chief Complaint   Patient presents with     Recheck Medication     would like to discuss dosage adjustment for venlafaxine     Pre-visit Screening:  Immunizations:  up to date  Colonoscopy:  NA  Mammogram: NA  Asthma Action Test/Plan:  NA  PHQ9:  NA  GAD7:  NA  Questioned patient about current smoking habits Pt. has never smoked.  Ok to leave detailed message on voice mail for today's visit only Yes, phone # [phone]

## 2022-01-08 LAB
% SATURATION - QUEST: 13 % (CALC) (ref 16–45)
ANA PATTERN - QUEST: ABNORMAL
ANA SCREEN - QUEST: POSITIVE
ANTINUCLEAR ANTIBODIES - QUEST: ABNORMAL TITER
FERRITIN SERPL-MCNC: 16 NG/ML (ref 16–154)
FOLATE: 12.9 NG/ML
IRON: 57 MCG/DL (ref 40–190)
TIBC - QUEST: 432 MCG/DL (CALC) (ref 250–450)
TSH SERPL-ACNC: 1.59 MIU/L
VIT B12 SERPL-MCNC: 604 PG/ML (ref 200–1100)

## 2022-01-10 ENCOUNTER — TRANSFERRED RECORDS (OUTPATIENT)
Dept: FAMILY MEDICINE | Facility: CLINIC | Age: 22
End: 2022-01-10

## 2022-01-26 ENCOUNTER — TRANSFERRED RECORDS (OUTPATIENT)
Dept: FAMILY MEDICINE | Facility: CLINIC | Age: 22
End: 2022-01-26

## 2022-02-19 ENCOUNTER — HEALTH MAINTENANCE LETTER (OUTPATIENT)
Age: 22
End: 2022-02-19

## 2022-03-14 ENCOUNTER — TRANSFERRED RECORDS (OUTPATIENT)
Dept: FAMILY MEDICINE | Facility: CLINIC | Age: 22
End: 2022-03-14

## 2022-04-11 NOTE — LETTER
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 She continues to have left anterior chest wall pain that has continued despite rest from rowing and upper body exercises. Now, she has noticed some pain radiating into left arm with numbness and tingling. No temperature or color changes.          Medications:     Current Outpatient Prescriptions   Medication Sig     norgestimate-ethinyl estradiol (ORTHO-CYCLEN/SPRINTEC) 0.25-35 MG-MCG tablet Take 1 tablet by mouth daily     venlafaxine (EFFEXOR XR) 37.5 MG 24 hr capsule Take 1 capsule (37.5 mg) by mouth daily     venlafaxine (EFFEXOR XR) 75 MG 24 hr capsule Take 1 capsule (75 mg) by mouth daily     No current facility-administered medications for this visit.              Allergies:      Allergies   Allergen Reactions     Sulfa Drugs Difficulty breathing            Review of Systems:   A comprehensive 10 point review of systems (constitutional, ENT, cardiac, peripheral vascular, respiratory, GI, , Musculoskeletal, skin, Neurological) was performed and found to be negative except as described in this note.           Physical Exam:   COMPLETE EXAMINATION:   VITAL SIGNS: /86  Pulse 79  Ht 1.88 m (6' 2.02\")  Wt 113.4 kg (250 lb)  LMP 11/25/2018  BMI 32.08 kg/m2  GEN: Alert, well-nourished, and in no distress.   NEURO: Alert and oriented to person, place, and time. Gait normal.   SKIN: No rash, warmth or erythema  PSYCH: Mood, memory, affect and judgment normal.   MSK: Left shoulder: anteriorly rotated with scapular dyskinesis, TTP anterior chest wall; normal ROM, cuff strength intact; Herbert and Amaya's tests positive, Adson's " negative         Imaging:     MRI left ribs:no definite findings to suggest stress changes in the visualized left upper  anterior ribs. No surrounding soft tissue muscular edema or abnormalities in the cartilage of the ribs.          Assessment:     Left anterior chest pain now with exam findings c/w thoracic outlet syndrome, no evidence for rib stress fracture        Plan:     1. Reviewed normal MRI results with athlete.  2. Referral to PT experienced with TOS  3. Continue activity modifications and sx care measures.   4. Follow up in 4 weeks.    Aurora Carmona was present for the entire visit.      Aurora Ca MD     oral

## 2022-04-26 ENCOUNTER — TRANSFERRED RECORDS (OUTPATIENT)
Dept: FAMILY MEDICINE | Facility: CLINIC | Age: 22
End: 2022-04-26

## 2022-05-04 ENCOUNTER — TRANSFERRED RECORDS (OUTPATIENT)
Dept: FAMILY MEDICINE | Facility: CLINIC | Age: 22
End: 2022-05-04

## 2022-05-11 ENCOUNTER — MYC MEDICAL ADVICE (OUTPATIENT)
Dept: FAMILY MEDICINE | Facility: CLINIC | Age: 22
End: 2022-05-11

## 2022-05-11 DIAGNOSIS — H81.8X9 PERSISTENT POSTURAL-PERCEPTUAL DIZZINESS: ICD-10-CM

## 2022-05-12 RX ORDER — VENLAFAXINE HYDROCHLORIDE 150 MG/1
150 TABLET, EXTENDED RELEASE ORAL DAILY
Qty: 30 TABLET | Refills: 0 | Status: SHIPPED | OUTPATIENT
Start: 2022-05-12 | End: 2022-05-24

## 2022-05-12 RX ORDER — VENLAFAXINE HYDROCHLORIDE 37.5 MG/1
37.5 TABLET, EXTENDED RELEASE ORAL DAILY
Qty: 30 TABLET | Refills: 0 | Status: SHIPPED | OUTPATIENT
Start: 2022-05-12 | End: 2022-05-24

## 2022-05-12 NOTE — TELEPHONE ENCOUNTER
Pt made an OV for 5/24/22    Shante Lara is requesting a refill of:    Pending Prescriptions:                       Disp   Refills    venlafaxine (EFFEXOR-ER) 150 MG 24 hr tab*15 tab*0            Sig: Take 1 tablet (150 mg) by mouth daily    venlafaxine (EFFEXOR-ER) 37.5 MG 24 hr ta*15 tab*0            Sig: Take 1 tablet (37.5 mg) by mouth daily

## 2022-05-24 ENCOUNTER — OFFICE VISIT (OUTPATIENT)
Dept: FAMILY MEDICINE | Facility: CLINIC | Age: 22
End: 2022-05-24

## 2022-05-24 VITALS
OXYGEN SATURATION: 99 % | HEIGHT: 72 IN | TEMPERATURE: 97.6 F | SYSTOLIC BLOOD PRESSURE: 110 MMHG | WEIGHT: 271 LBS | HEART RATE: 83 BPM | BODY MASS INDEX: 36.7 KG/M2 | DIASTOLIC BLOOD PRESSURE: 68 MMHG

## 2022-05-24 DIAGNOSIS — Z30.41 ENCOUNTER FOR SURVEILLANCE OF CONTRACEPTIVE PILLS: ICD-10-CM

## 2022-05-24 DIAGNOSIS — L70.0 CYSTIC ACNE: Primary | ICD-10-CM

## 2022-05-24 DIAGNOSIS — H81.8X9 PERSISTENT POSTURAL-PERCEPTUAL DIZZINESS: ICD-10-CM

## 2022-05-24 PROCEDURE — 99213 OFFICE O/P EST LOW 20 MIN: CPT | Performed by: PHYSICIAN ASSISTANT

## 2022-05-24 RX ORDER — NORGESTIMATE AND ETHINYL ESTRADIOL 0.25-0.035
1 KIT ORAL DAILY
Qty: 84 TABLET | Refills: 3 | Status: SHIPPED | OUTPATIENT
Start: 2022-05-24 | End: 2022-10-10

## 2022-05-24 RX ORDER — VENLAFAXINE HYDROCHLORIDE 37.5 MG/1
37.5 TABLET, EXTENDED RELEASE ORAL DAILY
Qty: 90 TABLET | Refills: 3 | Status: SHIPPED | OUTPATIENT
Start: 2022-05-24 | End: 2022-10-10

## 2022-05-24 RX ORDER — VENLAFAXINE HYDROCHLORIDE 150 MG/1
150 TABLET, EXTENDED RELEASE ORAL DAILY
Qty: 90 TABLET | Refills: 3 | Status: SHIPPED | OUTPATIENT
Start: 2022-05-24 | End: 2022-10-10

## 2022-05-24 RX ORDER — CLINDAMYCIN PHOSPHATE 10 MG/G
GEL TOPICAL 2 TIMES DAILY
Status: CANCELLED | OUTPATIENT
Start: 2022-05-24

## 2022-05-24 ASSESSMENT — ANXIETY QUESTIONNAIRES
1. FEELING NERVOUS, ANXIOUS, OR ON EDGE: NOT AT ALL
2. NOT BEING ABLE TO STOP OR CONTROL WORRYING: NOT AT ALL
7. FEELING AFRAID AS IF SOMETHING AWFUL MIGHT HAPPEN: NOT AT ALL
6. BECOMING EASILY ANNOYED OR IRRITABLE: NOT AT ALL
GAD7 TOTAL SCORE: 1
GAD7 TOTAL SCORE: 1
IF YOU CHECKED OFF ANY PROBLEMS ON THIS QUESTIONNAIRE, HOW DIFFICULT HAVE THESE PROBLEMS MADE IT FOR YOU TO DO YOUR WORK, TAKE CARE OF THINGS AT HOME, OR GET ALONG WITH OTHER PEOPLE: NOT DIFFICULT AT ALL
3. WORRYING TOO MUCH ABOUT DIFFERENT THINGS: NOT AT ALL
5. BEING SO RESTLESS THAT IT IS HARD TO SIT STILL: NOT AT ALL

## 2022-05-24 ASSESSMENT — PATIENT HEALTH QUESTIONNAIRE - PHQ9
SUM OF ALL RESPONSES TO PHQ QUESTIONS 1-9: 1
5. POOR APPETITE OR OVEREATING: SEVERAL DAYS

## 2022-05-24 NOTE — NURSING NOTE
Chief Complaint   Patient presents with     Recheck Medication     Venlafaxine and birth control         Pre-visit Screening:  Immunizations:  up to date  Colonoscopy:  NA  Mammogram: NA  Asthma Action Test/Plan:  NA  PHQ9:  Done today  GAD7:  Done today  Questioned patient about current smoking habits Pt. has never smoked.  Ok to leave detailed message on voice mail for today's visit only Yes, phone # [phone]

## 2022-05-24 NOTE — PROGRESS NOTES
"CC: Recheck Medication    History:  Persistent postural perceptual dizziness:  Discussed recurring symptoms of headaches, and \"out of body\" feeling at 1/2022 appt. This was similar to symptoms when working with [facility] to arrive at persistent postural perceptual dizziness diagnosis that has responded well to treatment with Effexor. She was scheduled to see neurologist in March, but we agreed ahead of time to increase Effexor to 150 + 37.5 mg. Since that time, has been taking higher dose. Headaches went away. Out of body feeling went away. Neurologist confirmed that higher doses can be needed for this condition at times, and didn't have any other concerns. She denies any side effects.     Cystic Acne:  Has been getting cystic acne on back. Would like to try stronger topical.     PMH, MEDICATIONS, ALLERGIES, SOCIAL AND FAMILY HISTORY in [facility] and reviewed by me personally.    ROS negative other than the symptoms noted above in the HPI.    Examination   /68 (BP Location: Right arm, Patient Position: Sitting, Cuff Size: Adult Large)   Pulse 83   Temp 97.6  F (36.4  C) (Temporal)   Ht 1.905 m (6' 3\")   Wt 122.9 kg (271 lb)   LMP 05/08/2022   SpO2 99%   BMI 33.87 kg/m       Constitutional: Sitting comfortably, in no acute distress. Vital signs noted  Neck:  no adenopathy, trachea midline and normal to palpation, thyroid normal to palpation  Cardiovascular:  regular rate and rhythm, no murmurs, clicks, or gallops  Respiratory:  normal respiratory rate and rhythm, lungs clear to auscultation  SKIN: No jaundice/pallor/rash.   Psychiatric: mentation appears normal and affect normal/bright      A/P    ICD-10-CM    1. Cystic acne  L70.0 clindamycin (CLINDAMAX) 1 % external gel   2. Encounter for surveillance of contraceptive pills  Z30.41 norgestimate-ethinyl estradiol (ORTHO-CYCLEN) 0.25-35 MG-MCG tablet   3. Persistent postural-perceptual dizziness  R42 venlafaxine (EFFEXOR-ER) 37.5 MG 24 hr tablet     " venlafaxine (EFFEXOR-ER) 150 MG 24 hr tablet       DISCUSSION:  Persistent postural perceptual dizziness:  Doing well on increased dose, symptoms controlled. Neurology did not recommend any further adjustments. Agreed to refill without change for 1 year.    Cystic Acne:  Since she has already tried OTC options, agreed to trial of clindamycin twice daily on affects areas. May eventually need referral to derm, or could consider 3-6 month course abx, but pt not interested in this at this time.     Refilled OCP 1 year, but could come in sooner for physical.     follow up visit: 1 year    Yadira Hadley PA-C  [facility]

## 2022-05-26 PROBLEM — L70.0 CYSTIC ACNE: Status: ACTIVE | Noted: 2022-05-26

## 2022-05-26 RX ORDER — CLINDAMYCIN PHOSPHATE 10 MG/G
GEL TOPICAL 2 TIMES DAILY
Qty: 60 G | Refills: 3 | Status: SHIPPED | OUTPATIENT
Start: 2022-05-26

## 2022-06-05 ENCOUNTER — MYC MEDICAL ADVICE (OUTPATIENT)
Dept: FAMILY MEDICINE | Facility: CLINIC | Age: 22
End: 2022-06-05

## 2022-06-08 ENCOUNTER — TRANSFERRED RECORDS (OUTPATIENT)
Dept: FAMILY MEDICINE | Facility: CLINIC | Age: 22
End: 2022-06-08

## 2022-08-09 ENCOUNTER — OFFICE VISIT (OUTPATIENT)
Dept: FAMILY MEDICINE | Facility: CLINIC | Age: 22
End: 2022-08-09

## 2022-08-09 VITALS
HEART RATE: 88 BPM | OXYGEN SATURATION: 98 % | TEMPERATURE: 98.1 F | WEIGHT: 282 LBS | BODY MASS INDEX: 38.19 KG/M2 | DIASTOLIC BLOOD PRESSURE: 74 MMHG | SYSTOLIC BLOOD PRESSURE: 120 MMHG | HEIGHT: 72 IN

## 2022-08-09 DIAGNOSIS — M25.562 CHRONIC PAIN OF LEFT KNEE: ICD-10-CM

## 2022-08-09 DIAGNOSIS — Z01.818 PRE-OP EXAM: Primary | ICD-10-CM

## 2022-08-09 DIAGNOSIS — G89.29 CHRONIC PAIN OF LEFT KNEE: ICD-10-CM

## 2022-08-09 LAB
% GRANULOCYTES: 54.1 %
BUN SERPL-MCNC: 14 MG/DL (ref 7–25)
BUN/CREATININE RATIO: 14.9 (ref 6–22)
CALCIUM SERPL-MCNC: 9.4 MG/DL (ref 8.6–10.3)
CHLORIDE SERPLBLD-SCNC: 102.6 MMOL/L (ref 98–110)
CO2 SERPL-SCNC: 29.3 MMOL/L (ref 20–32)
CREAT SERPL-MCNC: 0.94 MG/DL (ref 0.6–1.3)
GLUCOSE SERPL-MCNC: 91 MG/DL (ref 60–99)
HCT VFR BLD AUTO: 36.8 % (ref 35–47)
HEMOGLOBIN: 12.2 G/DL (ref 11.7–15.7)
LYMPHOCYTES NFR BLD AUTO: 36.2 %
MCH RBC QN AUTO: 27.5 PG (ref 26–33)
MCHC RBC AUTO-ENTMCNC: 33.2 G/DL (ref 31–36)
MCV RBC AUTO: 82.9 FL (ref 78–100)
MONOCYTES NFR BLD AUTO: 9.7 %
PLATELET COUNT - QUEST: 226 10^9/L (ref 150–375)
POTASSIUM SERPL-SCNC: 4.21 MMOL/L (ref 3.5–5.3)
RBC # BLD AUTO: 4.44 10*12/L (ref 3.8–5.2)
SODIUM SERPL-SCNC: 139.5 MMOL/L (ref 135–146)
WBC # BLD AUTO: 5.3 10*9/L (ref 4–11)

## 2022-08-09 PROCEDURE — 36415 COLL VENOUS BLD VENIPUNCTURE: CPT | Performed by: PHYSICIAN ASSISTANT

## 2022-08-09 PROCEDURE — 99214 OFFICE O/P EST MOD 30 MIN: CPT | Performed by: PHYSICIAN ASSISTANT

## 2022-08-09 PROCEDURE — 85025 COMPLETE CBC W/AUTO DIFF WBC: CPT | Performed by: PHYSICIAN ASSISTANT

## 2022-08-09 PROCEDURE — 80048 BASIC METABOLIC PNL TOTAL CA: CPT | Performed by: PHYSICIAN ASSISTANT

## 2022-08-09 NOTE — NURSING NOTE
Chief Complaint   Patient presents with     Pre-Op Exam     Femoral Osteotomy on 8/16/2022 at [facility] [place]

## 2022-08-09 NOTE — PROGRESS NOTES
[facility]  [address]  [address]  [facility] [address]  Phone: [phone]  Fax: [phone]  Primary Provider: Yadira Hadley  Pre-op Performing Provider: HILLARY RUBIO      PREOPERATIVE EVALUATION:  Today's date: 8/9/2022    Shante Lara is a 22 year old female who presents for a preoperative evaluation.    Surgical Information:  Surgery/Procedure: femoral osteotomy  Surgery Location: [facility]  Surgeon: Dr. Cote  Surgery Date: 8/16/2022  Time of Surgery: TBD  Where patient plans to recover: At home with family  Fax number for surgical facility: [phone]    Type of Anesthesia Anticipated: to be determined    Assessment & Plan     The proposed surgical procedure is considered INTERMEDIATE risk.    Chronic pain of left knee  Proceed with surgery at surgeon's discretion  - VENOUS COLLECTION  - HEMOGRAM PLATELET DIFF (BFP)  - Basic Metabolic Panel (BFP)    Pre-op exam    - VENOUS COLLECTION  - HEMOGRAM PLATELET DIFF (BFP)  - Basic Metabolic Panel (BFP)          Risks and Recommendations:  The patient has the following additional risks and recommendations for perioperative complications:   - No identified additional risk factors other than previously addressed    Medication Instructions:Pt will hold medications until after surgery    RECOMMENDATION:  APPROVAL GIVEN to proceed with proposed procedure, without further diagnostic evaluation.      Subjective     HPI related to upcoming procedure: Pt had knee surgery in January, this is next step in recovery.     1. No - Have you ever had a heart attack or stroke?  2. No - Have you ever had surgery on your heart or blood vessels, such as a stent, coronary (heart) bypass, or surgery on an artery in the head, neck, heart, or legs?  3. No - Do you have chest pain when you are physically active?  4. No - Do you have a history of heart failure?  5. No - Do you currently have a cold, bronchitis, or symptoms of  other respiratory (head and chest) infections?  6. No - Do you have a cough, shortness of breath, or wheezing?  7. No - Do you or anyone in your family have a history of blood clots?  8. No - Do you or anyone in your family have a serious bleeding problem, such as long-lasting bleeding after surgeries or cuts?  9. No - Have you ever had anemia or been told to take iron pills?  10. No - Have you had any abnormal blood loss such as black, tarry or bloody stools, or abnormal vaginal bleeding?  11. No - Have you ever had a blood transfusion?  12. Yes - Are you willing to have a blood transfusion if it is medically needed before, during, or after your surgery?  13. No - Have you or anyone in your family ever had problems with anesthesia (sedation for surgery)?  14. No - Do you have sleep apnea, excessive snoring, or daytime drowsiness?   15. No - Do you have any artifical heart valves or other implanted medical devices, such as a pacemaker, defibrillator, or continuous glucose monitor?  16. No - Do you have any artifical joints?  17. No - Are you allergic to latex?  18. No - Is there any chance that you may be pregnant?      Health Care Directive:  Patient does not have a Health Care Directive or Living Will: Discussed advance care planning with patient; information given to patient to review.    Preoperative Review of :   reviewed - controlled substances prescribed by other outside provider(s).      Status of Chronic Conditions:  See problem list for active medical problems.  Problems all longstanding and stable, except as noted/documented.  See ROS for pertinent symptoms related to these conditions.      Review of Systems  CONSTITUTIONAL: NEGATIVE for fever, chills, change in weight  INTEGUMENTARY/SKIN: NEGATIVE for worrisome rashes, moles or lesions  EYES: NEGATIVE for vision changes or irritation  ENT/MOUTH: NEGATIVE for ear, mouth and throat problems  RESP: NEGATIVE for significant cough or SOB  CV: NEGATIVE  for chest pain, palpitations or peripheral edema  GI: NEGATIVE for nausea, abdominal pain, heartburn, or change in bowel habits  : NEGATIVE for frequency, dysuria, or hematuria  NEURO: NEGATIVE for weakness, dizziness or paresthesias  ENDOCRINE: NEGATIVE for temperature intolerance, skin/hair changes  HEME: NEGATIVE for bleeding problems  PSYCHIATRIC: NEGATIVE for changes in mood or affect    Patient Active Problem List    Diagnosis Date Noted     Cystic acne 05/26/2022     Priority: Medium     History of headache 11/01/2020     Priority: Medium     Resolved 2014       Persistent postural-perceptual dizziness 12/02/2018     Priority: Medium     Controlled with Effexor       Teeth clenching 04/18/2018     Priority: Medium     Health Care Home 11/30/2018     Priority: Low     ACP (advance care planning) 11/30/2018     Priority: Low      Past Medical History:   Diagnosis Date     Anxiety      Depression      Raynaud's disease /phenomenon      Past Surgical History:   Procedure Laterality Date     AS KNEE SCOPE,MED/LAT MENISCUS REPAIR Left 01/24/2022    Lateral meniscus, [facility]     Current Outpatient Medications   Medication Sig Dispense Refill     clindamycin (CLINDAMAX) 1 % external gel Apply topically 2 times daily 60 g 3     norgestimate-ethinyl estradiol (ORTHO-CYCLEN) 0.25-35 MG-MCG tablet Take 1 tablet by mouth daily 84 tablet 3     venlafaxine (EFFEXOR-ER) 150 MG 24 hr tablet Take 1 tablet (150 mg) by mouth daily 90 tablet 3     venlafaxine (EFFEXOR-ER) 37.5 MG 24 hr tablet Take 1 tablet (37.5 mg) by mouth daily 90 tablet 3       Allergies   Allergen Reactions     Sulfa Drugs Difficulty breathing        Social History     Tobacco Use     Smoking status: Never Smoker     Smokeless tobacco: Never Used   Substance Use Topics     Alcohol use: Yes     Family History   Problem Relation Age of Onset     Thyroid Disease Mother      Breast Cancer No family hx of      Colon Cancer No family hx of      Diabetes No family hx  "of      Diabetes Type 1 No family hx of      Hyperlipidemia No family hx of      Hypertension No family hx of      History   Drug Use No         Objective     /74 (BP Location: Right arm, Patient Position: Sitting, Cuff Size: Adult Large)   Pulse 88   Temp 98.1  F (36.7  C) (Temporal)   Ht 1.905 m (6' 3\")   Wt 127.9 kg (282 lb)   SpO2 98%   BMI 35.25 kg/m      Physical Exam    GENERAL APPEARANCE: healthy, alert and no distress     EYES: EOMI, PERRL     HENT: ear canals and TM's normal and nose and mouth without ulcers or lesions     NECK: no adenopathy, no asymmetry, masses, or scars and thyroid normal to palpation     RESP: lungs clear to auscultation - no rales, rhonchi or wheezes     CV: regular rates and rhythm, normal S1 S2, no S3 or S4 and no murmur, click or rub     ABDOMEN:  soft, nontender, no HSM or masses and bowel sounds normal     MS: extremities normal- no gross deformities noted, no evidence of inflammation in joints, FROM in all extremities.     SKIN: no suspicious lesions or rashes     NEURO: Normal strength and tone, sensory exam grossly normal, mentation intact and speech normal     PSYCH: mentation appears normal. and affect normal/bright     LYMPHATICS: No cervical adenopathy    Recent Labs   Lab Test 01/04/22  0000   HGB 13.7           Diagnostics:  Recent Results (from the past 24 hour(s))   HEMOGRAM PLATELET DIFF (BFP)    Collection Time: 08/09/22 12:00 AM   Result Value Ref Range    WBC 5.3 4.0 - 11 10*9/L    RBC Count 4.44 3.8 - 5.2 10*12/L    Hemoglobin 12.2 11.7 - 15.7 g/dL    Hematocrit 36.8 35.0 - 47.0 %    MCV 82.9 78 - 100 fL    MCH 27.5 26 - 33 pg    MCHC 33.2 31 - 36 g/dL    Platelet Count 226 150 - 375 10^9/L    % Granulocytes 54.1 %    % Lymphocytes 36.2 %    % Monocytes 9.7 %   Basic Metabolic Panel (BFP)    Collection Time: 08/09/22 12:00 AM   Result Value Ref Range    Carbon Dioxide 29.3 20 - 32 mmol/L    Creatinine 0.94 0.60 - 1.30 mg/dL    Glucose 91 60 - " 99 mg/dL    Sodium 139.5 135 - 146 mmol/L    Potassium 4.21 3.5 - 5.3 mmol/L    Chloride 102.6 98 - 110 mmol/L    Urea Nitrogen 14 7 - 25 mg/dL    Calcium 9.4 8.6 - 10.3 mg/dL    BUN/Creatinine Ratio 14.9 6 - 22      No EKG required, no history of coronary heart disease, significant arrhythmia, peripheral arterial disease or other structural heart disease.    Revised Cardiac Risk Index (RCRI):  The patient has the following serious cardiovascular risks for perioperative complications:   - No serious cardiac risks = 0 points     RCRI Interpretation: 0 points: Class I (very low risk - 0.4% complication rate)           Signed Electronically by: Jeff King PA-C  Copy of this evaluation report is provided to requesting physician.

## 2022-08-18 ENCOUNTER — TRANSFERRED RECORDS (OUTPATIENT)
Dept: FAMILY MEDICINE | Facility: CLINIC | Age: 22
End: 2022-08-18

## 2022-10-10 ENCOUNTER — OFFICE VISIT (OUTPATIENT)
Dept: FAMILY MEDICINE | Facility: CLINIC | Age: 22
End: 2022-10-10

## 2022-10-10 VITALS
RESPIRATION RATE: 24 BRPM | HEIGHT: 72 IN | HEART RATE: 88 BPM | DIASTOLIC BLOOD PRESSURE: 76 MMHG | TEMPERATURE: 98 F | OXYGEN SATURATION: 99 % | SYSTOLIC BLOOD PRESSURE: 122 MMHG | WEIGHT: 283 LBS | BODY MASS INDEX: 38.33 KG/M2

## 2022-10-10 DIAGNOSIS — Z11.3 SCREEN FOR STD (SEXUALLY TRANSMITTED DISEASE): ICD-10-CM

## 2022-10-10 DIAGNOSIS — Z01.419 ENCOUNTER FOR GYNECOLOGICAL EXAMINATION WITHOUT ABNORMAL FINDING: Primary | ICD-10-CM

## 2022-10-10 DIAGNOSIS — Z12.4 SCREENING FOR CERVICAL CANCER: ICD-10-CM

## 2022-10-10 DIAGNOSIS — H81.8X9 PERSISTENT POSTURAL-PERCEPTUAL DIZZINESS: ICD-10-CM

## 2022-10-10 DIAGNOSIS — E66.9 OBESITY (BMI 30-39.9): ICD-10-CM

## 2022-10-10 PROCEDURE — 86696 HERPES SIMPLEX TYPE 2 TEST: CPT | Mod: 90 | Performed by: PHYSICIAN ASSISTANT

## 2022-10-10 PROCEDURE — 88142 CYTOPATH C/V THIN LAYER: CPT | Mod: 90 | Performed by: PHYSICIAN ASSISTANT

## 2022-10-10 PROCEDURE — 86592 SYPHILIS TEST NON-TREP QUAL: CPT | Mod: 90 | Performed by: PHYSICIAN ASSISTANT

## 2022-10-10 PROCEDURE — 87389 HIV-1 AG W/HIV-1&-2 AB AG IA: CPT | Mod: 90 | Performed by: PHYSICIAN ASSISTANT

## 2022-10-10 PROCEDURE — 86803 HEPATITIS C AB TEST: CPT | Mod: 90 | Performed by: PHYSICIAN ASSISTANT

## 2022-10-10 PROCEDURE — 90471 IMMUNIZATION ADMIN: CPT | Performed by: PHYSICIAN ASSISTANT

## 2022-10-10 PROCEDURE — 99395 PREV VISIT EST AGE 18-39: CPT | Mod: 25 | Performed by: PHYSICIAN ASSISTANT

## 2022-10-10 PROCEDURE — 86695 HERPES SIMPLEX TYPE 1 TEST: CPT | Mod: 90 | Performed by: PHYSICIAN ASSISTANT

## 2022-10-10 PROCEDURE — 90686 IIV4 VACC NO PRSV 0.5 ML IM: CPT | Performed by: PHYSICIAN ASSISTANT

## 2022-10-10 PROCEDURE — 87591 N.GONORRHOEAE DNA AMP PROB: CPT | Mod: 90 | Performed by: PHYSICIAN ASSISTANT

## 2022-10-10 PROCEDURE — 36415 COLL VENOUS BLD VENIPUNCTURE: CPT | Performed by: PHYSICIAN ASSISTANT

## 2022-10-10 PROCEDURE — 87491 CHLMYD TRACH DNA AMP PROBE: CPT | Mod: 90 | Performed by: PHYSICIAN ASSISTANT

## 2022-10-10 RX ORDER — VENLAFAXINE HYDROCHLORIDE 37.5 MG/1
37.5 TABLET, EXTENDED RELEASE ORAL DAILY
Qty: 90 TABLET | Refills: 3 | Status: SHIPPED | OUTPATIENT
Start: 2022-10-10 | End: 2023-10-30

## 2022-10-10 RX ORDER — HYDROXYZINE HYDROCHLORIDE 25 MG/1
TABLET, FILM COATED ORAL
COMMUNITY
Start: 2022-08-16 | End: 2022-10-10

## 2022-10-10 RX ORDER — ASPIRIN 325 MG
TABLET ORAL
COMMUNITY
Start: 2022-08-16 | End: 2022-10-24

## 2022-10-10 RX ORDER — ADAPALENE GEL USP, 0.3% 3 MG/G
GEL TOPICAL
COMMUNITY
Start: 2022-09-03

## 2022-10-10 RX ORDER — VENLAFAXINE HYDROCHLORIDE 150 MG/1
150 TABLET, EXTENDED RELEASE ORAL DAILY
Qty: 90 TABLET | Refills: 3 | Status: SHIPPED | OUTPATIENT
Start: 2022-10-10 | End: 2023-10-30

## 2022-10-10 ASSESSMENT — ANXIETY QUESTIONNAIRES
7. FEELING AFRAID AS IF SOMETHING AWFUL MIGHT HAPPEN: NOT AT ALL
GAD7 TOTAL SCORE: 1
5. BEING SO RESTLESS THAT IT IS HARD TO SIT STILL: NOT AT ALL
6. BECOMING EASILY ANNOYED OR IRRITABLE: NOT AT ALL
IF YOU CHECKED OFF ANY PROBLEMS ON THIS QUESTIONNAIRE, HOW DIFFICULT HAVE THESE PROBLEMS MADE IT FOR YOU TO DO YOUR WORK, TAKE CARE OF THINGS AT HOME, OR GET ALONG WITH OTHER PEOPLE: NOT DIFFICULT AT ALL
GAD7 TOTAL SCORE: 1
1. FEELING NERVOUS, ANXIOUS, OR ON EDGE: SEVERAL DAYS
2. NOT BEING ABLE TO STOP OR CONTROL WORRYING: NOT AT ALL
GAD7 TOTAL SCORE: 1
5. BEING SO RESTLESS THAT IT IS HARD TO SIT STILL: NOT AT ALL
IF YOU CHECKED OFF ANY PROBLEMS ON THIS QUESTIONNAIRE, HOW DIFFICULT HAVE THESE PROBLEMS MADE IT FOR YOU TO DO YOUR WORK, TAKE CARE OF THINGS AT HOME, OR GET ALONG WITH OTHER PEOPLE: NOT DIFFICULT AT ALL
3. WORRYING TOO MUCH ABOUT DIFFERENT THINGS: NOT AT ALL
2. NOT BEING ABLE TO STOP OR CONTROL WORRYING: NOT AT ALL
7. FEELING AFRAID AS IF SOMETHING AWFUL MIGHT HAPPEN: NOT AT ALL
1. FEELING NERVOUS, ANXIOUS, OR ON EDGE: SEVERAL DAYS
6. BECOMING EASILY ANNOYED OR IRRITABLE: NOT AT ALL
3. WORRYING TOO MUCH ABOUT DIFFERENT THINGS: NOT AT ALL

## 2022-10-10 ASSESSMENT — PATIENT HEALTH QUESTIONNAIRE - PHQ9
5. POOR APPETITE OR OVEREATING: NOT AT ALL
5. POOR APPETITE OR OVEREATING: NOT AT ALL
SUM OF ALL RESPONSES TO PHQ QUESTIONS 1-9: 1

## 2022-10-10 NOTE — NURSING NOTE
Chief Complaint   Patient presents with     Physical     Non fasting annual physical, pap due      Pre-visit Screening:  Immunizations:  up to date  Colonoscopy:  NA  Mammogram: NA  Asthma Action Test/Plan:  NA  PHQ9:  Given today   GAD7:  Given today   Questioned patient about current smoking habits Pt. has never smoked.  Ok to leave detailed message on voice mail for today's visit only Yes, phone # [phone]

## 2022-10-10 NOTE — PROGRESS NOTES
SUBJECTIVE:   CC: Shante Lara is an 22 year old woman who presents for preventive health visit.           Patient has been advised of split billing requirements and indicates understanding: Yes  HPI    On Venlafaxine for Dizziness - [place] Evaluation  Persistent postural perceptual dizziness.       Dermatologist - [facility].           Today's PHQ-2 Score:   PHQ-2 ( 1999 [employer]) 1/4/2022   Q1: Little interest or pleasure in doing things 0   Q2: Feeling down, depressed or hopeless 0   PHQ-2 Score 0   PHQ-2 Total Score (12-17 Years)- Positive if 3 or more points; Administer PHQ-A if positive -       Abuse: Current or Past (Physical, Sexual or Emotional) - No  Do you feel safe in your environment? Yes        Reviewed orders with patient.  Reviewed health maintenance and updated orders accordingly - Yes      Lab work is in process  Labs reviewed in EPIC  BP Readings from Last 3 Encounters:   10/10/22 122/76   08/09/22 120/74   05/24/22 110/68    Wt Readings from Last 3 Encounters:   10/10/22 128.4 kg (283 lb)   08/09/22 127.9 kg (282 lb)   05/24/22 122.9 kg (271 lb)                  Patient Active Problem List   Diagnosis     Teeth [facility]     ACP (advance care planning)     Persistent postural-perceptual dizziness     History of headache     Cystic acne     Obesity (BMI 30-39.9)     Past Surgical History:   Procedure Laterality Date     AS KNEE SCOPE,MED/LAT MENISCUS REPAIR Left 01/24/2022    Lateral meniscus, [facility]     [place] KNEE SCOPE,MED/LAT MENISCUS REPAIR Left 08/2022       Social History     Tobacco Use     Smoking status: Never     Smokeless tobacco: Never   Substance Use Topics     Alcohol use: Yes     Comment: 1-2 times per month     Family History   Problem Relation Age of Onset     Thyroid Disease Mother      No Known Problems Father      No Known Problems Sister      Breast Cancer No family hx of      Colon Cancer No family hx of       Diabetes No family hx of      Diabetes Type 1 No family hx of      Hyperlipidemia No family hx of      Hypertension No family hx of          Current Outpatient Medications   Medication Sig Dispense Refill     adapalene (DIFFERIN) 0.3 % external gel        aspirin (ASA) 325 MG tablet        clindamycin (CLINDAMAX) 1 % external gel Apply topically 2 times daily 60 g 3     venlafaxine (EFFEXOR-ER) 150 MG 24 hr tablet Take 1 tablet (150 mg) by mouth daily 90 tablet 3     venlafaxine (EFFEXOR-ER) 37.5 MG 24 hr tablet Take 1 tablet (37.5 mg) by mouth daily 90 tablet 3     Allergies   Allergen Reactions     Sulfa Drugs Difficulty breathing     Recent Labs   Lab Test 08/09/22  0000 01/04/22  1743 10/24/14  1007   A1C  --   --  5.4   CR 0.94  --  0.80   GFRESTIMATED  --   --  >90  Non  GFR Calc     GFRESTBLACK  --   --  >90  African American GFR Calc     POTASSIUM 4.21  --  4.0   TSH  --  1.59  --                            Past Medical History:   Diagnosis Date     Anxiety      Depression      Raynaud's disease /phenomenon       Past Surgical History:   Procedure Laterality Date     AS KNEE SCOPE,MED/LAT MENISCUS REPAIR Left 01/24/2022    Lateral meniscus, [facility]     [place] KNEE SCOPE,MED/LAT MENISCUS REPAIR Left 08/2022       Review of Systems  CONSTITUTIONAL: NEGATIVE for fever, chills, change in weight  INTEGUMENTARU/SKIN: NEGATIVE for worrisome rashes, moles or lesions  EYES: NEGATIVE for vision changes or irritation  ENT: NEGATIVE for ear, mouth and throat problems  RESP: NEGATIVE for significant cough or SOB  BREAST: NEGATIVE for masses, tenderness or discharge  CV: NEGATIVE for chest pain, palpitations or peripheral edema  GI: NEGATIVE for nausea, abdominal pain, heartburn, or change in bowel habits  : NEGATIVE for unusual urinary or vaginal symptoms. Periods are regular.  MUSCULOSKELETAL: NEGATIVE for significant arthralgias or myalgia  NEURO: NEGATIVE for weakness, dizziness or paresthesias  PSYCHIATRIC:  "NEGATIVE for changes in mood or affect     OBJECTIVE:   /76 (BP Location: Right arm, Patient Position: Sitting, Cuff Size: Adult Large)   Pulse 88   Temp 98  F (36.7  C) (Temporal)   Resp 24   Ht 1.905 m (6' 3\")   Wt 128.4 kg (283 lb)   LMP 09/19/2022 (Exact Date)   SpO2 99%   BMI 35.37 kg/m    Physical Exam  GENERAL: healthy, alert and no distress  EYES: Eyes grossly normal to inspection, PERRL and conjunctivae and sclerae normal  HENT: ear canals and TM's normal, nose and mouth without ulcers or lesions  NECK: no adenopathy, no asymmetry, masses, or scars and thyroid normal to palpation  RESP: lungs clear to auscultation - no rales, rhonchi or wheezes  BREAST: normal without masses, tenderness or nipple discharge and no palpable axillary masses or adenopathy  CV: regular rate and rhythm, normal S1 S2, no S3 or S4, no murmur, click or rub, no peripheral edema and peripheral pulses strong  ABDOMEN: soft, nontender, no hepatosplenomegaly, no masses and bowel sounds normal   (female): normal female external genitalia, normal urethral meatus, vaginal mucosa pink, moist, well rugated, and normal cervix/adnexa/uterus without masses or discharge  MS: no gross musculoskeletal defects noted, no edema  SKIN: no suspicious lesions or rashes  NEURO: Normal strength and tone, mentation intact and speech normal  PSYCH: mentation appears normal, affect normal/bright    Diagnostic Test Results:  Labs reviewed in Epic    ASSESSMENT/PLAN:   Shante was seen today for physical.    Diagnoses and all orders for this visit:    Encounter for gynecological examination without abnormal finding    Screening for cervical cancer  -     ThinPrep Pap and HPV (mRNA E6/E7) ([facility])    Screen for STD (sexually transmitted disease)  -     Chlam Trach/N. Gonorrhoeae RNA TMA([facility]  -     HEPATITIS C ANTIBODY ([facility])  -     Herpes Simplex 1 and 2 (IgG) Atb ([facility])  -     HIV 1/2 Agn Mary 4th Gen w Reflex ([facility])  -     RPR with Rflx to " "Titer and Confirm ([facility])  -     VENOUS COLLECTION    Persistent postural-perceptual dizziness  -     venlafaxine (EFFEXOR-ER) 150 MG 24 hr tablet; Take 1 tablet (150 mg) by mouth daily  -     venlafaxine (EFFEXOR-ER) 37.5 MG 24 hr tablet; Take 1 tablet (37.5 mg) by mouth daily    Obesity (BMI 30-39.9)    Other orders  -     FLU VAC PRESRV FREE QUAD SPLIT VIR 3+YRS IM        Patient has been advised of split billing requirements and indicates understanding: Yes    COUNSELING:  Reviewed preventive health counseling, as reflected in patient instructions    Estimated body mass index is 35.37 kg/m  as calculated from the following:    Height as of this encounter: 1.905 m (6' 3\").    Weight as of this encounter: 128.4 kg (283 lb).    Weight management plan: Discussed healthy diet and exercise guidelines    She reports that she has never smoked. She has never used smokeless tobacco.      Counseling Resources:  ATP IV Guidelines  Pooled Cohorts Equation Calculator  Breast Cancer Risk Calculator  BRCA-Related Cancer Risk Assessment: FHS-7 Tool  FRAX Risk Assessment  ICSI Preventive Guidelines  Dietary Guidelines for Americans, 2010  USDA's MyPlate  ASA Prophylaxis  Lung CA Screening    ABY Vasquez  [facility]    "

## 2022-10-11 LAB
HCV AB - QUEST: NORMAL
HERPES SIMPLEX TYPE 1 - QUEST: 2.64 INDEX
HERPES SIMPLEX TYPE 2 - QUEST: 1.68 INDEX
HIV 1/2 AGN ABY 4TH GEN WITH REFLEX: NORMAL
RPR SCREEN - QUEST: NORMAL
SIGNAL TO CUT OFF - QUEST: 0.08

## 2022-10-13 LAB
CHLAMYDIA TRACHOMATIS RNA, TMA - QUEST: NOT DETECTED
CLINICAL HISTORY - QUEST: NORMAL
COMMENT - QUEST: NORMAL
CYTOTECHNOLOGIST - QUEST: NORMAL
DESCRIPTIVE DIAGNOSIS - QUEST: NORMAL
LAST PAP DX - QUEST: NORMAL
LMP - QUEST: NORMAL
NEISSERIA GONORRHOEAE RNA TMA: NOT DETECTED
PREV BX DX - QUEST: NORMAL
SEE NOTE - QUEST: NORMAL
SOURCE: NORMAL
STATEMENT OF ADEQUACY - QUEST: NORMAL

## 2022-10-14 PROBLEM — E66.9 OBESITY (BMI 30-39.9): Status: ACTIVE | Noted: 2022-10-14

## 2022-10-24 ENCOUNTER — OFFICE VISIT (OUTPATIENT)
Dept: FAMILY MEDICINE | Facility: CLINIC | Age: 22
End: 2022-10-24

## 2022-10-24 VITALS
WEIGHT: 285.4 LBS | DIASTOLIC BLOOD PRESSURE: 70 MMHG | OXYGEN SATURATION: 98 % | SYSTOLIC BLOOD PRESSURE: 120 MMHG | HEIGHT: 72 IN | BODY MASS INDEX: 38.66 KG/M2 | TEMPERATURE: 97.7 F | HEART RATE: 78 BPM

## 2022-10-24 DIAGNOSIS — R89.4 POSITIVE TEST FOR HERPES SIMPLEX VIRUS (HSV) ANTIBODY: Primary | ICD-10-CM

## 2022-10-24 PROBLEM — G43.909 MIGRAINE HEADACHE: Status: ACTIVE | Noted: 2022-03-17

## 2022-10-24 PROCEDURE — 36415 COLL VENOUS BLD VENIPUNCTURE: CPT | Performed by: PHYSICIAN ASSISTANT

## 2022-10-24 PROCEDURE — 86696 HERPES SIMPLEX TYPE 2 TEST: CPT | Mod: 90 | Performed by: PHYSICIAN ASSISTANT

## 2022-10-24 PROCEDURE — 86695 HERPES SIMPLEX TYPE 1 TEST: CPT | Mod: 90 | Performed by: PHYSICIAN ASSISTANT

## 2022-10-24 PROCEDURE — 99212 OFFICE O/P EST SF 10 MIN: CPT | Performed by: PHYSICIAN ASSISTANT

## 2022-10-24 NOTE — NURSING NOTE
Chief Complaint   Patient presents with     lab work     Pt state she is here to get some blood work done    Pre-visit Screening:  Immunizations:  up to date  Colonoscopy: N/A  Mammogram: N/A  Asthma Action Test/Plan: N/A  PHQ9:  N/A  GAD7:  N/A  Questioned patient about current smoking habits Pt. has never smoked.  Ok to leave detailed message on voice mail for today's visit only YES, phone #   [phone]

## 2022-10-24 NOTE — PROGRESS NOTES
"Assessment & Plan     Positive test for herpes simplex virus (HSV) antibody  Diagnosis/positive result discussed  Never had outbreak  Explained transmission may still occur but low, alert all new partners    - Herpes Simplex 1 and 2 (IgG) Atb ([facility])  - VENOUS COLLECTION            ABY Vasquez  [facility]    Subjective     Nursing Notes:   Antonia Cuevas CMA  10/24/2022  4:39 PM  Signed  Chief Complaint   Patient presents with     lab work     Pt state she is here to get some blood work done    Pre-visit Screening:  Immunizations:  up to date  Colonoscopy: N/A  Mammogram: N/A  Asthma Action Test/Plan: N/A  PHQ9:  N/A  GAD7:  N/A  Questioned patient about current smoking habits Pt. has never smoked.  Ok to leave detailed message on voice mail for today's visit only YES, phone #   [phone]          Shante Lara is a 22 year old female who presents to clinic today for the following health issues:     HPI     Herpes + at CPE    No history of cold sores/genital or oral    Sister and father with oral herpes - found this out since last OV            Objective    /70 (BP Location: Right arm, Patient Position: Sitting, Cuff Size: Adult Large)   Pulse 78   Temp 97.7  F (36.5  C) (Tympanic)   Ht 1.905 m (6' 3\")   Wt 129.5 kg (285 lb 6.4 oz)   LMP 10/17/2022 (Exact Date)   SpO2 98%   BMI 35.67 kg/m    Body mass index is 35.67 kg/m .  Physical Exam   GENERAL: healthy, alert and no distress      "

## 2022-10-26 LAB
HERPES SIMPLEX TYPE 1 - QUEST: 1.65 INDEX
HERPES SIMPLEX TYPE 2 - QUEST: 1.7 INDEX

## 2022-11-16 ENCOUNTER — TRANSFERRED RECORDS (OUTPATIENT)
Dept: FAMILY MEDICINE | Facility: CLINIC | Age: 22
End: 2022-11-16

## 2023-02-01 ENCOUNTER — TRANSFERRED RECORDS (OUTPATIENT)
Dept: FAMILY MEDICINE | Facility: CLINIC | Age: 23
End: 2023-02-01

## 2023-09-25 ENCOUNTER — TRANSFERRED RECORDS (OUTPATIENT)
Dept: FAMILY MEDICINE | Facility: CLINIC | Age: 23
End: 2023-09-25

## 2023-10-09 ENCOUNTER — TRANSFERRED RECORDS (OUTPATIENT)
Dept: FAMILY MEDICINE | Facility: CLINIC | Age: 23
End: 2023-10-09

## 2023-10-26 DIAGNOSIS — H81.8X9 PERSISTENT POSTURAL-PERCEPTUAL DIZZINESS: ICD-10-CM

## 2023-10-26 RX ORDER — VENLAFAXINE HYDROCHLORIDE 37.5 MG/1
37.5 CAPSULE, EXTENDED RELEASE ORAL DAILY
COMMUNITY
Start: 2023-10-26

## 2023-10-26 RX ORDER — VENLAFAXINE HYDROCHLORIDE 150 MG/1
150 CAPSULE, EXTENDED RELEASE ORAL DAILY
COMMUNITY
Start: 2023-10-26

## 2023-10-26 NOTE — TELEPHONE ENCOUNTER
Shante Lara is requesting a refill of:    Refused Prescriptions:                       Disp   Refills    venlafaxine (EFFEXOR XR) 150 MG 24 hr caps*                Sig: TAKE ONE CAPSULE BY MOUTH ONE TIME DAILY  Refused By: DESMOND KEARNEY  Reason for Refusal: Patient needs appointment    venlafaxine (EFFEXOR XR) 37.5 MG 24 hr cap*                Sig: TAKE ONE CAPSULE BY MOUTH ONE TIME DAILY  Refused By: DESMOND KEARNEY  Reason for Refusal: Patient needs appointment    Pt due for CPX or OV

## 2023-10-30 ENCOUNTER — MYC MEDICAL ADVICE (OUTPATIENT)
Dept: FAMILY MEDICINE | Facility: CLINIC | Age: 23
End: 2023-10-30

## 2023-10-30 DIAGNOSIS — H81.8X9 PERSISTENT POSTURAL-PERCEPTUAL DIZZINESS: ICD-10-CM

## 2023-10-30 RX ORDER — VENLAFAXINE HYDROCHLORIDE 37.5 MG/1
37.5 CAPSULE, EXTENDED RELEASE ORAL DAILY
Qty: 10 CAPSULE | Refills: 0 | Status: SHIPPED | OUTPATIENT
Start: 2023-10-30 | End: 2023-11-20

## 2023-10-30 RX ORDER — VENLAFAXINE HYDROCHLORIDE 150 MG/1
150 CAPSULE, EXTENDED RELEASE ORAL DAILY
Qty: 10 CAPSULE | Refills: 0 | Status: SHIPPED | OUTPATIENT
Start: 2023-10-30 | End: 2023-11-20

## 2023-10-30 NOTE — TELEPHONE ENCOUNTER
Shante Lara is requesting a refill of:    Pending Prescriptions:                       Disp   Refills    venlafaxine (EFFEXOR XR) 150 MG 24 hr cap*10 cap*0            Sig: TAKE ONE CAPSULE BY MOUTH ONE TIME DAILY    venlafaxine (EFFEXOR XR) 37.5 MG 24 hr ca*10 cap*0            Sig: TAKE ONE CAPSULE BY MOUTH ONE TIME DAILY    Please close encounter if RX was sent. Thanks, Elsi

## 2023-11-20 ENCOUNTER — OFFICE VISIT (OUTPATIENT)
Dept: FAMILY MEDICINE | Facility: CLINIC | Age: 23
End: 2023-11-20

## 2023-11-20 VITALS
TEMPERATURE: 98.7 F | WEIGHT: 258 LBS | OXYGEN SATURATION: 98 % | SYSTOLIC BLOOD PRESSURE: 120 MMHG | HEART RATE: 75 BPM | DIASTOLIC BLOOD PRESSURE: 76 MMHG | BODY MASS INDEX: 32.25 KG/M2

## 2023-11-20 DIAGNOSIS — Z13.1 SCREENING FOR DIABETES MELLITUS: ICD-10-CM

## 2023-11-20 DIAGNOSIS — H81.8X9 PERSISTENT POSTURAL-PERCEPTUAL DIZZINESS: Primary | ICD-10-CM

## 2023-11-20 DIAGNOSIS — Z13.220 SCREENING FOR LIPID DISORDERS: ICD-10-CM

## 2023-11-20 LAB
CHOLEST SERPL-MCNC: 161 MG/DL (ref 0–199)
CHOLEST/HDLC SERPL: 3 {RATIO} (ref 0–5)
GLUCOSE SERPL-MCNC: 104 MG/DL (ref 60–99)
HDLC SERPL-MCNC: 61 MG/DL (ref 40–150)
LDLC SERPL CALC-MCNC: 82 MG/DL (ref 0–130)
TRIGL SERPL-MCNC: 90 MG/DL (ref 0–149)

## 2023-11-20 PROCEDURE — 99213 OFFICE O/P EST LOW 20 MIN: CPT

## 2023-11-20 PROCEDURE — 36415 COLL VENOUS BLD VENIPUNCTURE: CPT

## 2023-11-20 PROCEDURE — 80061 LIPID PANEL: CPT

## 2023-11-20 PROCEDURE — 82947 ASSAY GLUCOSE BLOOD QUANT: CPT

## 2023-11-20 RX ORDER — VENLAFAXINE HYDROCHLORIDE 37.5 MG/1
37.5 CAPSULE, EXTENDED RELEASE ORAL DAILY
Qty: 90 CAPSULE | Refills: 3 | Status: SHIPPED | OUTPATIENT
Start: 2023-11-20

## 2023-11-20 RX ORDER — VENLAFAXINE HYDROCHLORIDE 150 MG/1
150 CAPSULE, EXTENDED RELEASE ORAL DAILY
Qty: 90 CAPSULE | Refills: 3 | Status: SHIPPED | OUTPATIENT
Start: 2023-11-20

## 2023-11-20 ASSESSMENT — ANXIETY QUESTIONNAIRES
6. BECOMING EASILY ANNOYED OR IRRITABLE: NOT AT ALL
5. BEING SO RESTLESS THAT IT IS HARD TO SIT STILL: NOT AT ALL
IF YOU CHECKED OFF ANY PROBLEMS ON THIS QUESTIONNAIRE, HOW DIFFICULT HAVE THESE PROBLEMS MADE IT FOR YOU TO DO YOUR WORK, TAKE CARE OF THINGS AT HOME, OR GET ALONG WITH OTHER PEOPLE: NOT DIFFICULT AT ALL
GAD7 TOTAL SCORE: 0
GAD7 TOTAL SCORE: 0
1. FEELING NERVOUS, ANXIOUS, OR ON EDGE: NOT AT ALL
7. FEELING AFRAID AS IF SOMETHING AWFUL MIGHT HAPPEN: NOT AT ALL
2. NOT BEING ABLE TO STOP OR CONTROL WORRYING: NOT AT ALL
3. WORRYING TOO MUCH ABOUT DIFFERENT THINGS: NOT AT ALL

## 2023-11-20 ASSESSMENT — PATIENT HEALTH QUESTIONNAIRE - PHQ9
5. POOR APPETITE OR OVEREATING: NOT AT ALL
SUM OF ALL RESPONSES TO PHQ QUESTIONS 1-9: 1

## 2023-11-20 NOTE — PROGRESS NOTES
Assessment & Plan     1. Persistent postural-perceptual dizziness  - Well controlled, refills for Effexor sent. Discussed if she develops any symptoms that it would she will need to follow up with [facility] and/or neurology for a follow up and further workup.   - venlafaxine (EFFEXOR XR) 150 MG 24 hr capsule; Take 1 capsule (150 mg) by mouth daily  Dispense: 90 capsule; Refill: 3  - venlafaxine (EFFEXOR XR) 37.5 MG 24 hr capsule; Take 1 capsule (37.5 mg) by mouth daily  Dispense: 90 capsule; Refill: 3    2. Screening for lipid disorders  - Labs pending, patient will be notified of results.   - VENOUS COLLECTION  - Lipid Panel (BFP)    3. Screening for diabetes mellitus  - Labs pending, patient will be notified of results.   - VENOUS COLLECTION  - Glucose Fasting (BFP)    Return in about 1 year (around 11/20/2024) for Follow up medication recheck .    Rosa Isela Alfaro PA-C  [facility]    Subjective   Shante is a 23 year old, presenting for the following health issues:  Recheck Medication (Fasting medication check)    HPI     Shante presents for a fasting medication recheck. She has a history of persistent postural perceptual dizziness which was diagnosed by [facility] back in 2015. She is on Effexor 187.5 mg daily and states this is controlling her symptoms well. She denies any symptoms of dizziness, headaches, or any out of body experiences. She also denies any side effects and denies any symptoms of depression or anxiety. She last saw [facility] in 2021 along with Neurology through [facility] and it was recommended at the time to increase the Effexor to the 187.5 mg dose. She states she does not need to follow with [facility] or Neurology unless she has any symptoms.     Past medical history and daily medications reviewed by me personally. She is fasting today.       How many servings of fruits and vegetables do you eat daily?  2-3    On average, how many sweetened beverages do you drink each day (Examples: soda, juice,  sweet tea, etc.  Do NOT count diet or artificially sweetened beverages)?   0    How many days per week do you exercise enough to make your heart beat faster? 6    How many minutes a day do you exercise enough to make your heart beat faster? 60 or more    How many days per week do you miss taking your medication? 0    Review of Systems   Constitutional, HEENT, cardiovascular, pulmonary, gi and gu systems are negative, except as otherwise noted.      Objective    /76 (BP Location: Left arm, Patient Position: Sitting, Cuff Size: Adult Large)   Pulse 75   Temp 98.7  F (37.1  C) (Temporal)   Wt 117 kg (258 lb)   LMP  (LMP Unknown)   SpO2 98%   BMI 32.25 kg/m    Body mass index is 32.25 kg/m .  Physical Exam   GENERAL: healthy, alert and no distress  EYES: Eyes grossly normal to inspection, PERRL and conjunctivae and sclerae normal  HENT: ear canals and TM's normal, nose and mouth without ulcers or lesions  NECK: no adenopathy, no asymmetry, masses, or scars and thyroid normal to palpation  RESP: lungs clear to auscultation - no rales, rhonchi or wheezes  CV: regular rate and rhythm, normal S1 S2, no S3 or S4, no murmur, click or rub, no peripheral edema and peripheral pulses strong  ABDOMEN: soft, nontender, no hepatosplenomegaly, no masses and bowel sounds normal  MS: no gross musculoskeletal defects noted, no edema  SKIN: no suspicious lesions or rashes  NEURO: Normal strength and tone, mentation intact and speech normal  PSYCH: mentation appears normal, affect normal/bright    Lab work pending.

## 2023-11-20 NOTE — PATIENT INSTRUCTIONS
Thanks for coming in today Shante.     I will send you a my chart message regarding your lab results.     Please let me know if you have any questions or concerns.

## 2023-11-20 NOTE — NURSING NOTE
Chief Complaint   Patient presents with    Recheck Medication     Fasting medication check    Pre-visit Screening:  Immunizations:  up to date  Colonoscopy:  na  Mammogram: na  Asthma Action Test/Plan:  na  PHQ9:  updated at appointment  GAD7:  updated at appointment  Questioned patient about current smoking habits Pt. has never smoked.  Ok to leave detailed message on voice mail for today's visit only yes, phone # [phone]

## 2024-01-14 ENCOUNTER — HEALTH MAINTENANCE LETTER (OUTPATIENT)
Age: 24
End: 2024-01-14

## 2024-05-09 ENCOUNTER — OFFICE VISIT (OUTPATIENT)
Dept: FAMILY MEDICINE | Facility: CLINIC | Age: 24
End: 2024-05-09

## 2024-05-09 VITALS
SYSTOLIC BLOOD PRESSURE: 122 MMHG | OXYGEN SATURATION: 99 % | TEMPERATURE: 98.7 F | BODY MASS INDEX: 32.37 KG/M2 | HEART RATE: 93 BPM | WEIGHT: 259 LBS | DIASTOLIC BLOOD PRESSURE: 78 MMHG

## 2024-05-09 DIAGNOSIS — M25.50 ARTHRALGIA OF MULTIPLE JOINTS: Primary | ICD-10-CM

## 2024-05-09 DIAGNOSIS — M25.40 SWELLING OF JOINT OF MULTIPLE SITES: ICD-10-CM

## 2024-05-09 LAB — ERYTHROCYTE [SEDIMENTATION RATE] IN BLOOD: 17 MM/HR (ref 0–20)

## 2024-05-09 PROCEDURE — 86618 LYME DISEASE ANTIBODY: CPT | Mod: 90 | Performed by: FAMILY MEDICINE

## 2024-05-09 PROCEDURE — 36415 COLL VENOUS BLD VENIPUNCTURE: CPT | Performed by: FAMILY MEDICINE

## 2024-05-09 PROCEDURE — 86038 ANTINUCLEAR ANTIBODIES: CPT | Mod: 90 | Performed by: FAMILY MEDICINE

## 2024-05-09 PROCEDURE — 85651 RBC SED RATE NONAUTOMATED: CPT | Performed by: FAMILY MEDICINE

## 2024-05-09 PROCEDURE — 99213 OFFICE O/P EST LOW 20 MIN: CPT | Performed by: FAMILY MEDICINE

## 2024-05-09 PROCEDURE — 86431 RHEUMATOID FACTOR QUANT: CPT | Mod: 90 | Performed by: FAMILY MEDICINE

## 2024-05-09 NOTE — NURSING NOTE
Chief Complaint   Patient presents with    Swelling     Swelling in hands and ankles started last week, joints feel stiff and painful      Pre-visit Screening:  Immunizations:  up to date  Colonoscopy:  NA  Mammogram: NA  Asthma Action Test/Plan:  NA  PHQ9:  NA  GAD7:  NA  Questioned patient about current smoking habits Pt. quit smoking some time ago.  Ok to leave detailed message on voice mail for today's visit only Yes, phone # [phone]

## 2024-05-09 NOTE — PROGRESS NOTES
SUBJECTIVE:  Shante Lara, a 24 year old female scheduled an appointment to discuss the following issues:     Arthralgia of multiple joints  Swelling of joint of multiple sites  Pt noting acute onset of pain , swelling, and stiffness in both ankles through feet and both wrist through hands. Prestn for 10 days, seems ot be worsening. No fevers or chills, No loss of appetite.     NO insect bites    NO recent illness    No new meds, supplements, abx    No FH autoimmune disease    NO recent travel    No rash      Medical, social, surgical, and family histories reviewed.    ROS:  CONSTITUTIONAL: NEGATIVE for fever, chills  EYES: NEGATIVE for vision changes   RESP: NEGATIVE for significant cough or SOB  CV: NEGATIVE for chest pain, palpitations   GI: NEGATIVE for nausea, abdominal pain, heartburn, or change in bowel habits  : NEGATIVE for frequency, dysuria, or hematuria  NEURO: NEGATIVE for weakness, dizziness or paresthesias or headache  PSYCHIATRIC: NEGATIVE for changes in mood or affect    OBJECTIVE:  /78 (BP Location: Right arm, Patient Position: Sitting, Cuff Size: Adult Large)   Pulse 93   Temp 98.7  F (37.1  C) (Temporal)   Wt 117.5 kg (259 lb)   SpO2 99%   BMI 32.37 kg/m    EXAM:  GENERAL APPEARANCE: healthy, alert and no distress  EYES: EOMI,  PERRL  HENT: ear canals and TM's normal and nose and mouth without ulcers or lesions  RESP: lungs clear to auscultation - no rales, rhonchi or wheezes  CV: regular rates and rhythm, normal S1 S2, no S3 or S4 and no murmur, click or rub -  ABDOMEN:  soft, nontender, no HSM or masses and bowel sounds normal  MS: pt with mild swelling over bilateral hands and ankles/feet, ROM full in joints, no other joints affected  SKIN: no suspicious lesions or rashes  NEURO: Normal strength and tone, sensory exam grossly normal, mentation intact and speech normal  PSYCH: mentation appears normal and affect normal/bright    ASSESSMENT/PLAN:  (M25.50) Arthralgia of  multiple joints  (primary encounter diagnosis)  Comment: pt with acute arthralgia with swelling- minimal swelling on exam but feel we need to check inflammatory labs, pt does not have any worrisome symptoms at this time  Plan: labs, if normal and symptoms persist will need to see rheumatology    (M25.40) Swelling of joint of multiple sites  Comment: as above  Plan:

## 2024-05-12 LAB
ANA SCREEN - QUEST: NEGATIVE
B. BURGDORFERI 18 KD IGG - QUEST: NORMAL
B. BURGDORFERI 23 KD IGG - QUEST: NORMAL
B. BURGDORFERI 23 KD IGM - QUEST: NORMAL
B. BURGDORFERI 28 KD IGG - QUEST: NORMAL
B. BURGDORFERI 30 KD IGG - QUEST: NORMAL
B. BURGDORFERI 39 KD IGG - QUEST: NORMAL
B. BURGDORFERI 39 KD IGM - QUEST: NORMAL
B. BURGDORFERI 41 KD IGG - QUEST: NORMAL
B. BURGDORFERI 41 KD IGM - QUEST: NORMAL
B. BURGDORFERI 45 KD IGG - QUEST: NORMAL
B. BURGDORFERI 58 KD IGG - QUEST: NORMAL
B. BURGDORFERI 66 KD IGG - QUEST: NORMAL
B. BURGDORFERI 93 KD IGG - QUEST: NORMAL
B. BURGDORFERI ABY IGG QUAL: NEGATIVE
B. BURGDORFERI ABY IGM QUAL: NEGATIVE
LYME AB SCREEN - QUEST: 1.32 INDEX
RHEUMATOID FACT SER NEPH-ACNC: <10 IU/ML (ref 0–20)

## 2024-05-14 ENCOUNTER — MYC MEDICAL ADVICE (OUTPATIENT)
Dept: FAMILY MEDICINE | Facility: CLINIC | Age: 24
End: 2024-05-14

## 2024-05-14 DIAGNOSIS — M25.50 ARTHRALGIA OF MULTIPLE JOINTS: Primary | ICD-10-CM

## 2024-05-14 DIAGNOSIS — M25.40 SWELLING OF JOINT OF MULTIPLE SITES: ICD-10-CM

## 2024-05-20 NOTE — TELEPHONE ENCOUNTER
Patient is scheduled 6.3.2024 at 8:00am with     Dr. Yanci Dickson - [place] location   [facility] - Rheumatology   [address]  [place] [address]  [address] [place] [address] [address]  [place] [address] [phone] -- Scheduling  [phone] -- Fax

## 2024-05-28 ENCOUNTER — MYC MEDICAL ADVICE (OUTPATIENT)
Dept: FAMILY MEDICINE | Facility: CLINIC | Age: 24
End: 2024-05-28

## 2024-06-03 ENCOUNTER — TRANSFERRED RECORDS (OUTPATIENT)
Dept: FAMILY MEDICINE | Facility: CLINIC | Age: 24
End: 2024-06-03

## 2024-06-17 PROBLEM — Z76.89 HEALTH CARE HOME: Status: RESOLVED | Noted: 2018-11-30 | Resolved: 2024-06-17

## 2024-08-01 ENCOUNTER — OFFICE VISIT (OUTPATIENT)
Dept: FAMILY MEDICINE | Facility: CLINIC | Age: 24
End: 2024-08-01

## 2024-08-01 VITALS
OXYGEN SATURATION: 99 % | BODY MASS INDEX: 38.33 KG/M2 | RESPIRATION RATE: 20 BRPM | HEIGHT: 72 IN | HEART RATE: 88 BPM | SYSTOLIC BLOOD PRESSURE: 122 MMHG | WEIGHT: 283 LBS | TEMPERATURE: 98 F | DIASTOLIC BLOOD PRESSURE: 80 MMHG

## 2024-08-01 DIAGNOSIS — E66.812 CLASS 2 OBESITY DUE TO EXCESS CALORIES WITHOUT SERIOUS COMORBIDITY WITH BODY MASS INDEX (BMI) OF 35.0 TO 35.9 IN ADULT: Primary | ICD-10-CM

## 2024-08-01 DIAGNOSIS — R63.5 WEIGHT GAIN: ICD-10-CM

## 2024-08-01 DIAGNOSIS — E66.09 CLASS 2 OBESITY DUE TO EXCESS CALORIES WITHOUT SERIOUS COMORBIDITY WITH BODY MASS INDEX (BMI) OF 35.0 TO 35.9 IN ADULT: Primary | ICD-10-CM

## 2024-08-01 LAB
BUN SERPL-MCNC: 16 MG/DL (ref 7–25)
BUN/CREATININE RATIO: 17 (ref 6–32)
CALCIUM SERPL-MCNC: 8.7 MG/DL (ref 8.6–10.3)
CHLORIDE SERPLBLD-SCNC: 105.7 MMOL/L (ref 98–110)
CO2 SERPL-SCNC: 28.6 MMOL/L (ref 20–32)
CREAT SERPL-MCNC: 0.92 MG/DL (ref 0.6–1.3)
GLUCOSE SERPL-MCNC: 63 MG/DL (ref 60–99)
POTASSIUM SERPL-SCNC: 4.38 MMOL/L (ref 3.5–5.3)
SODIUM SERPL-SCNC: 141.1 MMOL/L (ref 135–146)

## 2024-08-01 PROCEDURE — 84443 ASSAY THYROID STIM HORMONE: CPT | Mod: 90 | Performed by: PHYSICIAN ASSISTANT

## 2024-08-01 PROCEDURE — 99214 OFFICE O/P EST MOD 30 MIN: CPT | Performed by: PHYSICIAN ASSISTANT

## 2024-08-01 PROCEDURE — 36415 COLL VENOUS BLD VENIPUNCTURE: CPT | Performed by: PHYSICIAN ASSISTANT

## 2024-08-01 PROCEDURE — 80048 BASIC METABOLIC PNL TOTAL CA: CPT | Performed by: PHYSICIAN ASSISTANT

## 2024-08-01 NOTE — NURSING NOTE
Chief Complaint   Patient presents with    Consult For     Wants to start taking weight loss medication, did talk to insurance and found out they would cover half of the cost for Zepbound starting at 2.5 mg      Pre-visit Screening:  Immunizations:  up to date  Colonoscopy:  na  Mammogram: na  Asthma Action Test/Plan:  na  PHQ9:  phq2 done today   GAD7:  na  Questioned patient about current smoking habits Pt. has never smoked.  Ok to leave detailed message on voice mail for today's visit only yes, phone # [phone] (home) none (work)

## 2024-08-01 NOTE — PROGRESS NOTES
SUBJECTIVE / OBJECTIVE:                                                Shante Lara is a 24 year old female seen for an initial visit for Medication Therapy Management.  She was referred to me by Yadira Hadley.     REASON FOR MTM REFERRAL: medication management services     PATIENT CHIEF COMPLAINT/CONCERN: weight loss    PAST MEDICAL HISTORY: Reviewed in chart    MEDICAL CONDITIONS REVIEWED:    Obesity      Current Outpatient Medications   Medication Sig Dispense Refill    tirzepatide-Weight Management (ZEPBOUND) 2.5 MG/0.5ML prefilled pen Inject 0.5 mLs (2.5 mg) subcutaneously every 7 days 2 mL 0    adapalene (DIFFERIN) 0.3 % external gel       clindamycin (CLINDAMAX) 1 % external gel Apply topically 2 times daily 60 g 3    venlafaxine (EFFEXOR XR) 150 MG 24 hr capsule Take 1 capsule (150 mg) by mouth daily 90 capsule 3    venlafaxine (EFFEXOR XR) 37.5 MG 24 hr capsule Take 1 capsule (37.5 mg) by mouth daily 90 capsule 3       Current labs include:  BP Readings from Last 3 Encounters:   08/01/24 122/80   05/09/24 122/78   11/20/23 120/76       LMP 07/10/2024 (Approximate)     Most Recent Immunizations   Administered Date(s) Administered    COVID-19 Monovalent 18+ (Moderna) 03/02/2021    DTAP (<7y) 02/03/2003    DTaP, Unspecified 02/16/2009    Flu, Unspecified 02/23/2007    HEPATITIS A (PEDS 12M-18Y) 03/07/2012    HIB, Unspecified 1998    HPV Quadrivalent 12/28/2012    Hepatitis A (ADULT 19+) 03/07/2012    Hepatitis B, Adult 1998    Hepatitis B, Peds 1998    Influenza (IIV3) PF 09/16/2021    Influenza Vaccine >6 months,quad, PF 10/10/2022    Influenza, seasonal, injectable, PF 12/28/2012    MMR 02/03/2003    Meningococcal ACWY (Menactra ) 02/16/2009    Meningococcal ACWY (Menveo ) 08/06/2014    Poliovirus, inactivated (IPV) 02/03/2003    TDAP (Adacel,Boostrix) 02/16/2009    TDAP Vaccine (Boostrix) 11/30/2018    TRIHIBIT (DTAP/HIB, <7y) 05/04/1999    Typhoid IM 05/26/2015    Varicella  02/16/2009       ASSESSMENT / PLAN:                                                       Obesity:  Assessment: Patient here today to discuss weight loss medications. She currently is active in [employer] and limits caloric intake.    Discussed GLP-1 RA medications for weight loss with patient.     Will initiate Zepbound 2.5mg as this is what is covered by her insurance. Will follow up in 4 weeks to determine taper.    Discussed how medication works, dosing, tapering, storage and stability, side effects and did in office demonstration of Zepbound auto-injector.    Patient feels comfortable with medication and will follow up with any side effects.    Status: Continued weight gain despite lifestyle modifications    Drug Therapy Problems:  1) Appropriate candidate for GLP-1 RA for weight loss    Plan:  1) Initiate Zepbound 2.5mg weekly  2) Follow up with patient in 4 weeks over phone to determine if taper is appropriate.      I spent 30 minutes with this patient today.  All changes were made via collaborative practice agreement with Yadira Hadley. A copy of the visit note was provided to the patient's primary care provider.    Cely Cunningham, PharmD  Medication Therapy Management Pharmacist  [facility]  Office Phone: [phone]

## 2024-08-01 NOTE — PROGRESS NOTES
"CC: Weight loss    History:  Weight loss:  Shante is here today to discuss weight loss options. Feels like she has been diligent with healthy eating, calorie limiting diet, exercise/cardio for multiple years. No formal weight loss program.  Former . Has gained 24 lbs in the last 9 months.     Weight goal:  <240 lbs would be out of obesity range  <215 would <BMI 27.     No FH thyroid cancer    PMH, MEDICATIONS, ALLERGIES, SOCIAL AND FAMILY HISTORY in [facility] and reviewed by me personally.    ROS negative other than the symptoms noted above in the HPI.    Examination   /80 (BP Location: Right arm, Patient Position: Sitting, Cuff Size: Adult Large)   Pulse 88   Temp 98  F (36.7  C) (Temporal)   Resp 20   Ht 1.905 m (6' 3\")   Wt 128.4 kg (283 lb)   LMP 07/10/2024 (Approximate)   SpO2 99%   BMI 35.37 kg/m       Constitutional: Sitting comfortably, in no acute distress. Vital signs noted  Neck:  no adenopathy, trachea midline and normal to palpation, thyroid normal to palpation  Cardiovascular:  regular rate and rhythm, no murmurs, clicks, or gallops  Respiratory:  normal respiratory rate and rhythm, lungs clear to auscultation  SKIN: No jaundice/pallor/rash.   Psychiatric: mentation appears normal and affect normal/bright      A/P    ICD-10-CM    1. Class 2 obesity due to excess calories without serious comorbidity with body mass index (BMI) of 35.0 to 35.9 in adult  E66.09 Basic Metabolic Panel (BFP)    Z68.35 Semaglutide-Weight Management (WEGOVY) 0.25 MG/0.5ML pen     Semaglutide-Weight Management (WEGOVY) 0.5 MG/0.5ML pen      2. Weight gain  R63.5 VENOUS COLLECTION     TSH WITH FREE T4 REFLEX (QUEST)          DISCUSSION:  Obesity:  Given that Shante has been experience further weight gain despite lifestyle changes for sustained over the past several years, will check TSH and establish baseline BMP today. Once TSH confirmed normal, recommended starting on titration course of Wegovy. She is " meeting with Cely Cunningham pharmacist to discuss use of this medication further following her appt with me. Start with 0.25 mg/week for 4 doses, then increase to 0.5 mg week for 4 weeks if tolerating. I would like her to follow-up at that time. Warned of side effects, and to contact us if noted. Also discuss general progression on this medication increasing dose to tolerating level with adequate weight loss, and then decreasing slowly off once weight goal achieved. Also emphasized importance of sustained lifestyle changes to be able to maintain weight as dose is decreased/stopped.    follow up visit: 2 months    Yadira Hadley PA-C  [facility]

## 2024-08-02 DIAGNOSIS — E66.9 OBESITY (BMI 30-39.9): Primary | ICD-10-CM

## 2024-08-02 LAB — TSH SERPL-ACNC: 2.31 MIU/L

## 2024-08-02 NOTE — TELEPHONE ENCOUNTER
Zepbound is not covered. Can you resend the Wegovy for Cely?    Shante Lara is requesting a refill of:    Pending Prescriptions:                       Disp   Refills    Semaglutide-Weight Management (WEGOVY) 0.*2 mL   0            Sig: Inject 0.25 mg subcutaneously once a week

## 2024-09-13 DIAGNOSIS — E66.9 OBESITY (BMI 30-39.9): Primary | ICD-10-CM

## 2024-09-13 NOTE — TELEPHONE ENCOUNTER
Patient has completed Wegovy 0.25mg x 4 weeks as well as Wegovy 0.5mg. Patient notes no side effects, but little results as far as weight loss at this point.    Will increase to the 1mg injection x 4 weeks.    Patient will be due for clinic visit prior to moving to 1.7mg.    Cely Cunningham, PharmD  Clinical Pharmacist  [facility]  General Clinic Phone: [phone]  Direct Office Phone: [phone]

## 2024-10-02 RX ORDER — SEMAGLUTIDE 1 MG/.5ML
INJECTION, SOLUTION SUBCUTANEOUS
COMMUNITY
Start: 2024-10-02

## 2024-10-02 NOTE — TELEPHONE ENCOUNTER
Shante Lara is requesting a refill of:    Refused Prescriptions:                       Disp   Refills    Semaglutide-Weight Management (WEGOVY) 1 M*                Sig: INJECT 1 MG SUBCUTANEOUSLY ONCE A WEEK  Refused By: JOANNA CHAVIRA  Reason for Refusal: Patient needs appointment    Needs OV for refills

## 2024-10-10 ENCOUNTER — OFFICE VISIT (OUTPATIENT)
Dept: FAMILY MEDICINE | Facility: CLINIC | Age: 24
End: 2024-10-10

## 2024-10-10 VITALS
SYSTOLIC BLOOD PRESSURE: 122 MMHG | DIASTOLIC BLOOD PRESSURE: 76 MMHG | WEIGHT: 279 LBS | TEMPERATURE: 98.1 F | HEART RATE: 96 BPM | OXYGEN SATURATION: 98 % | BODY MASS INDEX: 34.87 KG/M2

## 2024-10-10 DIAGNOSIS — H81.8X9 PERSISTENT POSTURAL-PERCEPTUAL DIZZINESS: ICD-10-CM

## 2024-10-10 LAB
BUN SERPL-MCNC: 12 MG/DL (ref 7–25)
BUN/CREATININE RATIO: 12 (ref 6–32)
CALCIUM SERPL-MCNC: 9.3 MG/DL (ref 8.6–10.3)
CHLORIDE SERPLBLD-SCNC: 102.2 MMOL/L (ref 98–110)
CO2 SERPL-SCNC: 25.9 MMOL/L (ref 20–32)
CREAT SERPL-MCNC: 1.04 MG/DL (ref 0.6–1.3)
GLUCOSE SERPL-MCNC: 82 MG/DL (ref 60–99)
POTASSIUM SERPL-SCNC: 4.18 MMOL/L (ref 3.5–5.3)
SODIUM SERPL-SCNC: 136.9 MMOL/L (ref 135–146)

## 2024-10-10 PROCEDURE — 90471 IMMUNIZATION ADMIN: CPT | Performed by: PHYSICIAN ASSISTANT

## 2024-10-10 PROCEDURE — 80048 BASIC METABOLIC PNL TOTAL CA: CPT | Performed by: PHYSICIAN ASSISTANT

## 2024-10-10 PROCEDURE — 99214 OFFICE O/P EST MOD 30 MIN: CPT | Mod: 25 | Performed by: PHYSICIAN ASSISTANT

## 2024-10-10 PROCEDURE — 90656 IIV3 VACC NO PRSV 0.5 ML IM: CPT | Performed by: PHYSICIAN ASSISTANT

## 2024-10-10 PROCEDURE — 36415 COLL VENOUS BLD VENIPUNCTURE: CPT | Performed by: PHYSICIAN ASSISTANT

## 2024-10-10 RX ORDER — VENLAFAXINE HYDROCHLORIDE 150 MG/1
150 CAPSULE, EXTENDED RELEASE ORAL DAILY
Qty: 90 CAPSULE | Refills: 3 | Status: CANCELLED | OUTPATIENT
Start: 2024-10-10

## 2024-10-10 RX ORDER — VENLAFAXINE HYDROCHLORIDE 225 MG/1
225 TABLET, EXTENDED RELEASE ORAL DAILY
Qty: 90 TABLET | Refills: 0 | Status: SHIPPED | OUTPATIENT
Start: 2024-10-10

## 2024-10-10 RX ORDER — VENLAFAXINE HYDROCHLORIDE 37.5 MG/1
37.5 CAPSULE, EXTENDED RELEASE ORAL DAILY
Qty: 90 CAPSULE | Refills: 3 | Status: CANCELLED | OUTPATIENT
Start: 2024-10-10

## 2024-10-10 NOTE — NURSING NOTE
Chief Complaint   Patient presents with    Recheck Medication     Refill medications, non-fasting today, she would like to go up to the next dose of Wegovy     Pre-visit Screening:  Immunizations:  up to date  Colonoscopy:  NA  Mammogram: NA  Asthma Action Test/Plan:  NA  PHQ9:  NA  GAD7:  NA  Questioned patient about current smoking habits Pt. has never smoked.  Ok to leave detailed message on voice mail for today's visit only Yes, phone # [phone]

## 2024-10-10 NOTE — PROGRESS NOTES
CC: Medication Check    History:  Fatigue, dizziness, joint pain:  Has been working with specialist in Integrative Medicine at [facility] for ongoing fatigue, joint pain starting 6 months ago. 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 Her symptoms are gradually improving, but they had mentioned that she could do a trial of increased Effexor dose to see if this helps symptomatically. She would like to try this as fatigue is still notable.  Pt was originally started on Effexor through [facility] for unexplained dizziness.     Obesity:   Was initiated on and titrated up on Wegovy starting 8/3/2024. Has now been taking 1 mg weekly for the past 4 weeks. This dose was the first dose where she could feel the effects, more appetite suppression. Denies any side effects. No abdominal pain, constipation, nausea. Has lost 4 lbs. Would like increase dose further today.     PMH, MEDICATIONS, ALLERGIES, SOCIAL AND FAMILY HISTORY in [facility] and reviewed by me personally.    ROS negative other than the symptoms noted above in the HPI.      Examination   /76 (BP Location: Right arm, Patient Position: Sitting, Cuff Size: Adult Large)   Pulse 96   Temp 98.1  F (36.7  C) (Temporal)   Wt 126.6 kg (279 lb)   LMP 09/26/2024   SpO2 98%   BMI 34.87 kg/m       Constitutional: Sitting comfortably, in no acute distress. Vital signs noted  Neck:  no adenopathy, trachea midline and normal to palpation, thyroid normal to palpation  Cardiovascular:  regular rate and rhythm, no murmurs, clicks, or gallops  Respiratory:  normal respiratory rate and rhythm, lungs clear to auscultation  SKIN: No jaundice/pallor/rash.   Psychiatric: mentation appears normal and affect normal/bright        A/P    ICD-10-CM    1. BMI 34.0-34.9,adult  Z68.34 VENOUS COLLECTION     Basic Metabolic Panel (BFP)     Semaglutide-Weight Management (WEGOVY) 1.7 MG/0.75ML pen      DISCONTINUED: Semaglutide-Weight Management (WEGOVY) 1.7 MG/0.75ML pen      2. Persistent postural-perceptual dizziness  H81.8X9 venlafaxine (EFFEXOR-ER) 225 MG 24 hr tablet          DISCUSSION:  Fatigue, dizziness, joint pain:  Agreed to do trial of Effexor 225 mg daily. Monitor for side effects and contact me if noted. If no improvement in fatigue could consider decreasing back down to current dose (150 + 37.5 mg) daily and monitor fatigue for further improvement.     Obesity:   Will update BMP today (creatinine was mildly increased at [place] after first 2 doses of lower dose). Agreed to increase dose to 1.7 mg weekly assuming no lab abnormalities. Monitor for side effects. Continue healthy lifestyle changes. Recheck in 2 months.     follow up visit: 2 months    [facility]

## 2024-11-01 ENCOUNTER — TELEPHONE (OUTPATIENT)
Dept: FAMILY MEDICINE | Facility: CLINIC | Age: 24
End: 2024-11-01

## 2024-11-01 NOTE — Clinical Note
Please see note. Patient is frustrated with slow weight loss with Wegovy. Discussed the differences with Shante of [facility], and she is wanting to switch at this time. She is not sure this is covered on her plan, so will attempt PA, however also discussed

## 2024-11-01 NOTE — TELEPHONE ENCOUNTER
Patient called to follow up and is frustrated with the slow weight loss with Wegovy.    I feel patient would find Zepbound more beneficial due to the involvement of the GIP receptor.    Will send in order for Zepbound 5mg weekly and follow up with patient in one month if approved to continue taper.    Cely Cunningham, PharmD  Clinical Pharmacist  [facility] Phone: [phone]  Direct Office Phone: [phone]

## 2024-11-12 NOTE — TELEPHONE ENCOUNTER
Received a form from insurance, filled out and faxed back. May need to try and fail Saxenda and Wegovy

## 2024-12-11 ENCOUNTER — OFFICE VISIT (OUTPATIENT)
Dept: FAMILY MEDICINE | Facility: CLINIC | Age: 24
End: 2024-12-11

## 2024-12-11 VITALS
BODY MASS INDEX: 33.45 KG/M2 | TEMPERATURE: 97.2 F | HEART RATE: 87 BPM | WEIGHT: 267.6 LBS | SYSTOLIC BLOOD PRESSURE: 118 MMHG | DIASTOLIC BLOOD PRESSURE: 70 MMHG | OXYGEN SATURATION: 99 %

## 2024-12-11 DIAGNOSIS — H81.8X9 PERSISTENT POSTURAL-PERCEPTUAL DIZZINESS: ICD-10-CM

## 2024-12-11 DIAGNOSIS — U09.9 LONG COVID: ICD-10-CM

## 2024-12-11 LAB
BUN SERPL-MCNC: 14 MG/DL (ref 7–25)
BUN/CREATININE RATIO: 12 (ref 6–32)
CALCIUM SERPL-MCNC: 9.4 MG/DL (ref 8.6–10.3)
CHLORIDE SERPLBLD-SCNC: 105.1 MMOL/L (ref 98–110)
CO2 SERPL-SCNC: 26.8 MMOL/L (ref 20–32)
CREAT SERPL-MCNC: 1.15 MG/DL (ref 0.6–1.3)
GLUCOSE SERPL-MCNC: 80 MG/DL (ref 60–99)
POTASSIUM SERPL-SCNC: 4.48 MMOL/L (ref 3.5–5.3)
SODIUM SERPL-SCNC: 140.4 MMOL/L (ref 135–146)

## 2024-12-11 NOTE — NURSING NOTE
Chief Complaint   Patient presents with    Recheck Medication     Non-fasting med check and refill      Pre-visit Screening:  Immunizations:  up to date  Colonoscopy:    Mammogram:   Asthma Action Test/Plan:    PHQ9:    GAD7:    Questioned patient about current smoking habits Pt. never.  Ok to leave detailed message on voice mail for today's visit only yes, phone # [phone] (home) none (work)

## 2024-12-11 NOTE — PROGRESS NOTES
CC: Medication Check    History:  Fatigue, dizziness, joint pain:  Was last here 10/10/2024. See that note for detail. Agreed to increase venlafaxine to 225 mg daily. This has not caused any side effects, but no obvious change in the gradual improvement of symptoms that was already happening. Would like to stay on this dose for now.    Obesity:  At last appt, had lost 4 lbs on Wegovy 1 mg weekly. Agreed to increase to 1.7 mg weekly. This has gone well. Denies any side effects with this dose- no nausea, constipation. Is also following healthy nutrition and works ou frequently with weight training.  Has lost 12 lbs in 2 months.     PMH, MEDICATIONS, ALLERGIES, SOCIAL AND FAMILY HISTORY in [facility] and reviewed by me personally.    ROS negative other than the symptoms noted above in the HPI.      Examination   /70 (BP Location: Left arm, Patient Position: Sitting, Cuff Size: Adult Large)   Pulse 87   Temp 97.2  F (36.2  C) (Temporal)   Wt 121.4 kg (267 lb 9.6 oz)   LMP 11/27/2024 (Approximate)   SpO2 99%   BMI 33.45 kg/m         Constitutional: Sitting comfortably, in no acute distress. Vital signs noted  Neck:  no adenopathy, trachea midline and normal to palpation, thyroid normal to palpation  Cardiovascular:  regular rate and rhythm, no murmurs, clicks, or gallops  Respiratory:  normal respiratory rate and rhythm, lungs clear to auscultation  SKIN: No jaundice/pallor/rash.   Psychiatric: mentation appears normal and affect normal/bright        A/P    ICD-10-CM    1. BMI 33.0-33.9,adult  Z68.33 Semaglutide-Weight Management (WEGOVY) 1.7 MG/0.75ML pen     VENOUS COLLECTION     Basic Metabolic Panel (BFP)          DISCUSSION:  Fatigue, dizziness, joint pain:  Reassured by gradually improving symptoms. Will refill medication without change for 6 months.     Obesity:  Weight loss is occurring at a safe pace. Will update BMP and send at[facility]. Pt doesn't have a specific weight loss goal in mind, more how her body  feels. Will refill medication without change for 6 months.     follow up visit: 6 months    Yadira Hadley PA-C  [facility]

## 2024-12-19 ENCOUNTER — MYC MEDICAL ADVICE (OUTPATIENT)
Dept: FAMILY MEDICINE | Facility: CLINIC | Age: 24
End: 2024-12-19

## 2024-12-19 DIAGNOSIS — H81.8X9 PERSISTENT POSTURAL-PERCEPTUAL DIZZINESS: Primary | ICD-10-CM

## 2024-12-19 RX ORDER — VENLAFAXINE HYDROCHLORIDE 37.5 MG/1
37.5 CAPSULE, EXTENDED RELEASE ORAL DAILY
Qty: 90 CAPSULE | Refills: 1 | Status: SHIPPED | OUTPATIENT
Start: 2024-12-19

## 2024-12-19 RX ORDER — VENLAFAXINE HYDROCHLORIDE 150 MG/1
150 CAPSULE, EXTENDED RELEASE ORAL DAILY
Qty: 90 CAPSULE | Refills: 1 | Status: SHIPPED | OUTPATIENT
Start: 2024-12-19

## 2025-01-06 ENCOUNTER — TELEPHONE (OUTPATIENT)
Dept: FAMILY MEDICINE | Facility: CLINIC | Age: 25
End: 2025-01-06

## 2025-01-06 NOTE — TELEPHONE ENCOUNTER
Patient needing new authorization from insurance company for Wegovy. Called and answered questions over telephone. Coverage approved through 01/26/2026.    Cely Cunningham, PharmD  Clinical Pharmacist  [facility]  General Clinic Phone: [phone]  Direct Office Phone: [phone]

## 2025-01-26 ENCOUNTER — HEALTH MAINTENANCE LETTER (OUTPATIENT)
Age: 25
End: 2025-01-26

## 2025-04-22 ENCOUNTER — OFFICE VISIT (OUTPATIENT)
Dept: FAMILY MEDICINE | Facility: CLINIC | Age: 25
End: 2025-04-22

## 2025-04-22 VITALS
TEMPERATURE: 97.9 F | HEART RATE: 80 BPM | WEIGHT: 243 LBS | SYSTOLIC BLOOD PRESSURE: 118 MMHG | DIASTOLIC BLOOD PRESSURE: 68 MMHG | OXYGEN SATURATION: 99 % | BODY MASS INDEX: 30.37 KG/M2

## 2025-04-22 PROCEDURE — 99213 OFFICE O/P EST LOW 20 MIN: CPT | Performed by: PHYSICIAN ASSISTANT

## 2025-04-22 RX ORDER — TRETINOIN 1 MG/G
GEL TOPICAL
COMMUNITY
Start: 2025-02-04

## 2025-04-22 NOTE — NURSING NOTE
Chief Complaint   Patient presents with    Recheck Medication     Switching wegovy, insurance changing, want to know if anything else would be covered     Pre-visit Screening:  Immunizations:  up to date  Colonoscopy:  na  Mammogram: na  Asthma Action Test/Plan:  na  PHQ9:  na  GAD7:  na  Questioned patient about current smoking habits Pt. has never smoked.  Ok to leave detailed message on voice mail for today's visit only yes, phone # [phone] (home) none (work)

## 2025-04-22 NOTE — PROGRESS NOTES
CC: Medication Check    History:  Obesity:  Shante was started on Wegovy 8/2024 at weight of 283 lbs (BMI 35). We have titrated dose up to 1.7 mg/week, which she has been stable on since 10/10/2024. She is down to 243 lbs a loss of 40 lbs and 24 more lbs since 12/2024 appt.     Unfortunately, she is running into issues with insurance coverage. Is due for next injection tomorrow, and has no more medication left. Was told it was be $750 for next pen.     They told they do cover Contrave (already taking), Imcivree (No), Xenical, Qsymia (worth considering).    PMH, MEDICATIONS, ALLERGIES, SOCIAL AND FAMILY HISTORY in [facility] and reviewed by me personally.    ROS negative other than the symptoms noted above in the HPI.    Examination   /68 (BP Location: Right arm, Patient Position: Sitting, Cuff Size: Adult Large)   Pulse 80   Temp 97.9  F (36.6  C) (Temporal)   Wt 110.2 kg (243 lb)   LMP 04/08/2025 (Exact Date)   SpO2 99%   BMI 30.37 kg/m       Constitutional: Sitting comfortably, in no acute distress. Vital signs noted  Neck:  no adenopathy, trachea midline and normal to palpation, thyroid normal to palpation  Cardiovascular:  regular rate and rhythm, no murmurs, clicks, or gallops  Respiratory:  normal respiratory rate and rhythm, lungs clear to auscultation  SKIN: No jaundice/pallor/rash.   Psychiatric: mentation appears normal and affect normal/bright        A/P    ICD-10-CM    1. BMI 35.0-35.9,adult  Z68.35           DISCUSSION:  Obesity:  Concerned that pt is being abruptly cut off of Wegovy coverage. If insurance is truly stopping coverage, would benefit from gradually tapering from safety and maintaining weight loss perspective.    Will check with Cely Octavio to see if she has ideas of how we can manage this.     I ultimately, don't feel that any of the alternative options given by insurance would offer similar effect, but could consider referral to weight loss clinic to consider phentermine or Qysmia.      follow up visit: As needed    [facility]

## 2025-05-15 NOTE — TELEPHONE ENCOUNTER
Patient tried Zepbound but did not feel was as effective as Wegovy. Will send Wegovy Rx to [facility].    Cely Cunningham, PharmD  Clinical Pharmacist  [facility] Phone: [phone]  Direct Office Phone: [phone]